# Patient Record
Sex: MALE | Race: WHITE | Employment: PART TIME | URBAN - METROPOLITAN AREA
[De-identification: names, ages, dates, MRNs, and addresses within clinical notes are randomized per-mention and may not be internally consistent; named-entity substitution may affect disease eponyms.]

---

## 2017-04-04 ENCOUNTER — TELEPHONE (OUTPATIENT)
Dept: CARDIOLOGY | Facility: CLINIC | Age: 67
End: 2017-04-04

## 2017-04-04 DIAGNOSIS — N52.9 ED (ERECTILE DYSFUNCTION): ICD-10-CM

## 2017-04-04 RX ORDER — SILDENAFIL 100 MG/1
100 TABLET, FILM COATED ORAL DAILY PRN
Qty: 4 TABLET | Refills: 3 | Status: SHIPPED | OUTPATIENT
Start: 2017-04-04 | End: 2017-04-11

## 2017-04-04 NOTE — TELEPHONE ENCOUNTER
Pt called and asked if prescription could be mailed to him.  Informed him it could. Placed in mail.

## 2017-04-04 NOTE — TELEPHONE ENCOUNTER
Pt called and left a message asking for the print out prescription of Viagra so he can take it to [facility] to fill it since it is cheaper.  He stated it is ok to leave a VM on his phone.  Called patient back and left a message that writer will print out the Viagra prescription and have it available for him to  in the [place] Office. Team 4 number left for questions or concerns.

## 2017-04-11 RX ORDER — SILDENAFIL 100 MG/1
100 TABLET, FILM COATED ORAL DAILY PRN
Qty: 50 TABLET | Refills: 0 | Status: SHIPPED | OUTPATIENT
Start: 2017-04-11 | End: 2017-04-25

## 2017-04-11 NOTE — TELEPHONE ENCOUNTER
Patient called and stated that he received a prescription for Viagra however, he would like to increase the quantity of 50 tablets. Spoke with Dr. Little and he stated that would be fine. Prescription printed and mailed to patients home.

## 2017-04-25 ENCOUNTER — OFFICE VISIT (OUTPATIENT)
Dept: FAMILY MEDICINE | Facility: CLINIC | Age: 67
End: 2017-04-25
Payer: COMMERCIAL

## 2017-04-25 VITALS
HEART RATE: 67 BPM | SYSTOLIC BLOOD PRESSURE: 120 MMHG | TEMPERATURE: 98.5 F | BODY MASS INDEX: 28.2 KG/M2 | DIASTOLIC BLOOD PRESSURE: 70 MMHG | WEIGHT: 179.7 LBS | OXYGEN SATURATION: 96 % | HEIGHT: 67 IN

## 2017-04-25 DIAGNOSIS — E78.5 HYPERLIPIDEMIA LDL GOAL <70: ICD-10-CM

## 2017-04-25 DIAGNOSIS — N52.9 ERECTILE DYSFUNCTION, UNSPECIFIED ERECTILE DYSFUNCTION TYPE: ICD-10-CM

## 2017-04-25 DIAGNOSIS — Z00.00 ROUTINE GENERAL MEDICAL EXAMINATION AT A HEALTH CARE FACILITY: Primary | ICD-10-CM

## 2017-04-25 DIAGNOSIS — I25.10 CORONARY ARTERY DISEASE INVOLVING NATIVE CORONARY ARTERY OF NATIVE HEART WITHOUT ANGINA PECTORIS: ICD-10-CM

## 2017-04-25 DIAGNOSIS — E78.5 HYPERLIPIDEMIA LDL GOAL <100: ICD-10-CM

## 2017-04-25 LAB — HGB BLD-MCNC: 13.9 G/DL (ref 13.3–17.7)

## 2017-04-25 PROCEDURE — 85018 HEMOGLOBIN: CPT | Performed by: FAMILY MEDICINE

## 2017-04-25 PROCEDURE — G0438 PPPS, INITIAL VISIT: HCPCS | Performed by: FAMILY MEDICINE

## 2017-04-25 PROCEDURE — 36415 COLL VENOUS BLD VENIPUNCTURE: CPT | Performed by: FAMILY MEDICINE

## 2017-04-25 PROCEDURE — 80053 COMPREHEN METABOLIC PANEL: CPT | Performed by: FAMILY MEDICINE

## 2017-04-25 PROCEDURE — 80061 LIPID PANEL: CPT | Performed by: FAMILY MEDICINE

## 2017-04-25 RX ORDER — MULTIVIT WITH MINERALS/LUTEIN
1 TABLET ORAL DAILY
COMMUNITY

## 2017-04-25 RX ORDER — SILDENAFIL CITRATE 20 MG/1
TABLET ORAL
Qty: 30 TABLET | Refills: 5 | Status: SHIPPED | OUTPATIENT
Start: 2017-04-25 | End: 2018-10-12

## 2017-04-25 RX ORDER — SIMVASTATIN 20 MG
20 TABLET ORAL AT BEDTIME
Qty: 90 TABLET | Refills: 4 | Status: SHIPPED | OUTPATIENT
Start: 2017-04-25 | End: 2017-09-21

## 2017-04-25 NOTE — NURSING NOTE
"Chief Complaint   Patient presents with     Physical       Initial /70 (BP Location: Right arm, Patient Position: Chair, Cuff Size: Adult Large)  Pulse 67  Temp 98.5  F (36.9  C) (Oral)  Ht 5' 7\" (1.702 m)  Wt 179 lb 11.2 oz (81.5 kg)  SpO2 96%  BMI 28.14 kg/m2 Estimated body mass index is 28.14 kg/(m^2) as calculated from the following:    Height as of this encounter: 5' 7\" (1.702 m).    Weight as of this encounter: 179 lb 11.2 oz (81.5 kg).  Medication Reconciliation: complete   MITA Cabrera      "

## 2017-04-25 NOTE — MR AVS SNAPSHOT
After Visit Summary   4/25/2017    Mj Meeks    MRN: [id number]           Patient Information     Date Of Birth          1950        Visit Information        Provider Department      4/25/2017 10:30 AM Reinier Santiago MD [facility]        Today's Diagnoses     Routine general medical examination at a health care facility    -  1    Hyperlipidemia LDL goal <70        Coronary artery disease involving native coronary artery of native heart without angina pectoris        Hyperlipidemia LDL goal <100        Erectile dysfunction, unspecified erectile dysfunction type          Care Instructions      Preventive Health Recommendations:   Male Ages 65 and over    Yearly exam:             See your health care provider every year in order to  o   Review health changes.   o   Discuss preventive care.    o   Review your medicines if your doctor has prescribed any.    Talk with your health care provider about whether you should have a test to screen for prostate cancer (PSA).    Every 3 years, have a diabetes test (fasting glucose). If you are at risk for diabetes, you should have this test more often.    Every 5 years, have a cholesterol test. Have this test more often if you are at risk for high cholesterol or heart disease.     Every 10 years, have a colonoscopy. Or, have a yearly FIT test (stool test). These exams will check for colon cancer.    Talk to with your health care provider about screening for Abdominal Aortic Aneurysm if you have a family history of AAA or have a history of smoking.    Shots:     Get a flu shot each year.     Get a tetanus shot every 10 years.     Talk to your doctor about your pneumonia vaccines. There are now two you should receive - Pneumovax (PPSV 23) and Prevnar (PCV 13).     Talk to your doctor about a shingles vaccine.     Talk to your doctor about the hepatitis B vaccine.  Nutrition:     Eat at least 5 servings of fruits and vegetables each day.  "    Eat whole-grain bread, whole-wheat pasta and brown rice instead of white grains and rice.     Talk to your provider about Calcium and Vitamin D.   Lifestyle    Exercise for at least 150 minutes a week (30 minutes a day, 5 days a week). This will help you control your weight and prevent disease.     Limit alcohol to one drink per day.     No smoking.     Wear sunscreen to prevent skin cancer.     See your dentist every six months for an exam and cleaning.     See your eye doctor every 1 to 2 years to screen for conditions such as glaucoma, macular degeneration, cataracts, etc         Follow-ups after your visit        Who to contact     If you have questions or need follow up information about today's clinic visit or your schedule please contact [facility] directly at [phone].  Normal or non-critical lab and imaging results will be communicated to you by MyChart, letter or phone within 4 business days after the clinic has received the results. If you do not hear from us within 7 days, please contact the clinic through MyChart or phone. If you have a critical or abnormal lab result, we will notify you by phone as soon as possible.  Submit refill requests through [facility] or call your pharmacy and they will forward the refill request to us. Please allow 3 business days for your refill to be completed.          Additional Information About Your Visit        Shahiya Information     [facility]ya lets you send messages to your doctor, view your test results, renew your prescriptions, schedule appointments and more. To sign up, go to [email or web address] . Click on \"Log in\" on the left side of the screen, which will take you to the Welcome page. Then click on \"Sign up Now\" on the right side of the page.     You will be asked to enter the access code listed below, as well as some personal information. Please follow the directions to create your username and password.     Your access code is: " [email or web address]  Expires: 2017 11:12 AM     Your access code will  in 90 days. If you need help or a new code, please call your [place] clinic or [phone].        Care EveryWhere ID     This is your Care EveryWhere ID. This could be used by other organizations to access your [place] medical records  [id number]        Your Vitals Were     Pulse Temperature Height Pulse Oximetry BMI (Body Mass Index)       67 98.5  F (36.9  C) (Oral) 5' 7\" (1.702 m) 96% 28.14 kg/m2        Blood Pressure from Last 3 Encounters:   17 120/70   10/26/16 136/74   16 132/74    Weight from Last 3 Encounters:   17 179 lb 11.2 oz (81.5 kg)   16 181 lb 1.6 oz (82.1 kg)   16 186 lb 6.4 oz (84.6 kg)              We Performed the Following     Comprehensive metabolic panel     Hemoglobin     Lipid panel reflex to direct LDL          Today's Medication Changes          These changes are accurate as of: 17 11:12 AM.  If you have any questions, ask your nurse or doctor.               Start taking these medicines.        Dose/Directions    sildenafil 20 MG tablet   Commonly known as:  REVATIO/VIAGRA   Used for:  Erectile dysfunction, unspecified erectile dysfunction type   Started by:  Reinier Santiago MD        Take 2-3 tablet (40-60 mg) by mouth daily as needed.  Never use with nitroglycerin, terazosin or doxazosin.   Quantity:  30 tablet   Refills:  5         Stop taking these medicines if you haven't already. Please contact your care team if you have questions.     sildenafil 100 MG cap/tab   Commonly known as:  VIAGRA   Stopped by:  Reinier Santiago MD                Where to get your medicines      These medications were sent to [facility] [address] [facility] [address] [facility] [address] [address] [address] [facility], [facility] [address]     Phone:  [phone]     simvastatin 20 MG tablet         Call your pharmacy to confirm that your medication is ready for pickup. It " may take up to 24 hours for them to receive the prescription. If the prescription is not ready within 3 business days, please contact your clinic or your provider.     We will let you know when these medications are ready. If you don't hear back within 3 business days, please contact us.     sildenafil 20 MG tablet                Primary Care Provider Office Phone # Fax #    Reinier Santiago [phone]       [facility] [address]  [facility] [address]        Thank you!     Thank you for choosing [facility]  for your care. Our goal is always to provide you with excellent care. Hearing back from our patients is one way we can continue to improve our services. Please take a few minutes to complete the written survey that you may receive in the mail after your visit with us. Thank you!             Your Updated Medication List - Protect others around you: Learn how to safely use, store and throw away your medicines at www.disposemymeds.org.          This list is accurate as of: 4/25/17 11:12 AM.  Always use your most recent med list.                   Brand Name Dispense Instructions for use    aspirin 81 MG tablet      Take 81 mg by mouth daily       CENTRUM SILVER per tablet      Take 1 tablet by mouth daily       sildenafil 20 MG tablet    REVATIO/VIAGRA    30 tablet    Take 2-3 tablet (40-60 mg) by mouth daily as needed.  Never use with nitroglycerin, terazosin or doxazosin.       simvastatin 20 MG tablet    ZOCOR    90 tablet    Take 1 tablet (20 mg) by mouth At Bedtime       VITAMIN C PO      Take 1,000 mg by mouth       VITAMIN D3 PO      Take by mouth daily

## 2017-04-25 NOTE — PROGRESS NOTES
SUBJECTIVE:                                                            Mj Meeks is a 67 year old male who presents for Preventive Visit.    Are you in the first 12 months of your Medicare Part B coverage?  No    Healthy Habits:    Do you get at least three servings of calcium containing foods daily (dairy, green leafy vegetables, etc.)? no    Amount of exercise or daily activities, outside of work: 5-7 day(s) per week    Problems taking medications regularly No    Medication side effects: No    Have you had an eye exam in the past two years? yes    Do you see a dentist twice per year? yes    Do you have sleep apnea, excessive snoring or daytime drowsiness?no    COGNITIVE SCREEN  1) Repeat 3 items (Banana, Sunrise, Chair)    2) Clock draw: NORMAL  3) 3 item recall: Recalls 2 objects   Results: NORMAL clock, 1-2 items recalled: COGNITIVE IMPAIRMENT LESS LIKELY    Mini-CogTM Copyright S Henrique. Licensed by the author for use in [facility]; reprinted with permission (elisabeth@[employer]). All rights reserved.            No concerns     Reviewed and updated as needed this visit by clinical staff  Tobacco  Allergies  Med Hx  Surg Hx  Fam Hx  Soc Hx        Reviewed and updated as needed this visit by Provider        Social History   Substance Use Topics     Smoking status: Former Smoker     Packs/day: 2.00     Years: 20.00     Smokeless tobacco: Former User     Quit date: 9/23/2012     Alcohol use 0.0 oz/week     0 Standard drinks or equivalent per week      Comment: 2+ drinks daily     Today's PHQ-2 Score:   PHQ-2 ( 1999 Pfizer) 7/19/2016 7/11/2016   Q1: Little interest or pleasure in doing things 0 0   Q2: Feeling down, depressed or hopeless 0 0   PHQ-2 Score 0 0       Do you feel safe in your environment - Yes    Do you have a Health Care Directive?: No: Advance care planning was reviewed with patient; patient declined at this time.    Current providers sharing in care for this patient include:  "  Patient Care Team:  Reinier Santiago MD as PCP - General (Family Practice)      Hearing impairment: No    Ability to successfully perform activities of daily living: Yes, no assistance needed     Fall risk:  Fallen 2 or more times in the past year?: No  Any fall with injury in the past year?: No    Home safety:  none identified      The following health maintenance items are reviewed in Epic and correct as of today:  Health Maintenance   Topic Date Due     LUNG CANCER SCREENING ANNUAL  05/13/2016     INFLUENZA VACCINE (SYSTEM ASSIGNED)  09/01/2017     FALL RISK ASSESSMENT  04/25/2018     COLONOSCOPY Q5 YR INBASKET MESSAGE  06/22/2020     ADVANCE DIRECTIVE PLANNING Q5 YRS (NO INBASKET)  06/28/2021     LIPID SCREEN Q5 YR MALE (SYSTEM ASSIGNED)  04/25/2022     TETANUS IMMUNIZATION (SYSTEM ASSIGNED)  10/10/2023     PNEUMOCOCCAL  Completed     AORTIC ANEURYSM SCREENING (SYSTEM ASSIGNED)  Completed     HEPATITIS C SCREENING  Completed       ROS:  Constitutional, HEENT, cardiovascular, pulmonary, gi and gu systems are negative, except as otherwise noted.    Problem list, Medication list, Allergies, and Medical/Social/Surgical histories reviewed in [facility] and updated as appropriate.  OBJECTIVE:                                                            /70 (BP Location: Right arm, Patient Position: Chair, Cuff Size: Adult Large)  Pulse 67  Temp 98.5  F (36.9  C) (Oral)  Ht 5' 7\" (1.702 m)  Wt 179 lb 11.2 oz (81.5 kg)  SpO2 96%  BMI 28.14 kg/m2 Estimated body mass index is 28.14 kg/(m^2) as calculated from the following:    Height as of this encounter: 5' 7\" (1.702 m).    Weight as of this encounter: 179 lb 11.2 oz (81.5 kg).  EXAM:   GENERAL: healthy, alert and no distress  EYES: Eyes grossly normal to inspection, PERRL and conjunctivae and sclerae normal  HENT: ear canals and TM's normal, nose and mouth without ulcers or lesions  NECK: no adenopathy, no asymmetry, masses, or scars and thyroid normal to " palpation  RESP: lungs clear to auscultation - no rales, rhonchi or wheezes  CV: regular rate and rhythm, normal S1 S2, no S3 or S4, no murmur, click or rub, no peripheral edema and peripheral pulses strong  ABDOMEN: soft, nontender, no hepatosplenomegaly, no masses and bowel sounds normal   (male): normal male genitalia without lesions or urethral discharge, no hernia  MS: no gross musculoskeletal defects noted, no edema  SKIN: no suspicious lesions or rashes  NEURO: Normal strength and tone, mentation intact and speech normal  PSYCH: mentation appears normal, affect normal/bright    ASSESSMENT / PLAN:                                                            1. Routine general medical examination at a health care facility    2. Hyperlipidemia LDL goal <70  - simvastatin (ZOCOR) 20 MG tablet; Take 1 tablet (20 mg) by mouth At Bedtime  Dispense: 90 tablet; Refill: 4  - Comprehensive metabolic panel  - Lipid panel reflex to direct LDL    3. Coronary artery disease involving native coronary artery of native heart without angina pectoris  - Hemoglobin    4. Hyperlipidemia LDL goal <100    5. Erectile dysfunction, unspecified erectile dysfunction type  - sildenafil (REVATIO/VIAGRA) 20 MG tablet; Take 2-3 tablet (40-60 mg) by mouth daily as needed.  Never use with nitroglycerin, terazosin or doxazosin.  Dispense: 30 tablet; Refill: 5    End of Life Planning:  Patient currently has an advanced directive: No.  I have verified the patient's ablity to prepare an advanced directive/make health care decisions.  Literature was provided to assist patient in preparing an advanced directive.    COUNSELING:  Reviewed preventive health counseling, as reflected in patient instructions    BP Screening:   Last 3 BP Readings:    BP Readings from Last 3 Encounters:   04/25/17 120/70   10/26/16 136/74   09/13/16 132/74       The following was recommended to the patient:  Re-screen BP within a year and recommended lifestyle  "modifications    Estimated body mass index is 28.14 kg/(m^2) as calculated from the following:    Height as of this encounter: 5' 7\" (1.702 m).    Weight as of this encounter: 179 lb 11.2 oz (81.5 kg).  Weight management plan: Discussed healthy diet and exercise guidelines and patient will follow up in 12 months in clinic to re-evaluate.   reports that he has quit smoking. He has a 40.00 pack-year smoking history. He quit smokeless tobacco use about 4 years ago.    Appropriate preventive services were discussed with this patient, including applicable screening as appropriate for cardiovascular disease, diabetes, osteopenia/osteoporosis, and glaucoma.  As appropriate for age/gender, discussed screening for colorectal cancer, prostate cancer, breast cancer, and cervical cancer. Checklist reviewing preventive services available has been given to the patient.    Reviewed patients plan of care and provided an AVS. The Basic Care Plan (routine screening as documented in Health Maintenance) for Mj meets the Care Plan requirement. This Care Plan has been established and reviewed with the Patient.    Counseling Resources:  ATP IV Guidelines  Pooled Cohorts Equation Calculator  Breast Cancer Risk Calculator  FRAX Risk Assessment  ICSI Preventive Guidelines  Dietary Guidelines for Americans, 2010  USDA's MyPlate  ASA Prophylaxis  Lung CA Screening    Reinier Santiago MD  [facility]  "

## 2017-04-25 NOTE — LETTER
[facility]          [address] [place] [address]                                                                                                       [phone]      Mj Meeks  [address]  [facility] [address]      Dear Mj,    Your complete metabolic panel indicates normal kidney function, normal electrolyte levels (sodium, potassium, and calcium were normal), normal blood sugar level (glucose), and normal liver function (ALT, AST, bilirubin).    Your cholesterol results were all normal.  I would recommend recheck fasting lipid panel in 2-3 years as this looks very good.  Continue your simvastatin.    Your hemoglobin (red blood cell count) was normal.    Enclosed is a copy of the results.      Thank you for choosing [facility]. We appreciate the opportunity to serve you and look forward to supporting your healthcare needs in the future.    If you have any questions or concerns, please call me or my nurse at [phone].        Sincerely,    Reinier Santiago MD          Results for orders placed or performed in visit on 04/25/17   Comprehensive metabolic panel   Result Value Ref Range    Sodium 140 133 - 144 mmol/L    Potassium 4.4 3.4 - 5.3 mmol/L    Chloride 106 94 - 109 mmol/L    Carbon Dioxide 24 20 - 32 mmol/L    Anion Gap 10 3 - 14 mmol/L    Glucose 99 70 - 99 mg/dL    Urea Nitrogen 10 7 - 30 mg/dL    Creatinine 0.81 0.66 - 1.25 mg/dL    GFR Estimate >90  Non  GFR Calc   >60 mL/min/1.7m2    GFR Estimate If Black >90   GFR Calc   >60 mL/min/1.7m2    Calcium 8.6 8.5 - 10.1 mg/dL    Bilirubin Total 1.1 0.2 - 1.3 mg/dL    Albumin 4.1 3.4 - 5.0 g/dL    Protein Total 7.1 6.8 - 8.8 g/dL    Alkaline Phosphatase 107 40 - 150 U/L    ALT 23 0 - 70 U/L    AST 22 0 - 45 U/L   Lipid panel reflex to direct LDL   Result Value Ref Range    Cholesterol 111 <200 mg/dL    Triglycerides 59 <150 mg/dL    HDL  Cholesterol 68 >39 mg/dL    LDL Cholesterol Calculated 31 <100 mg/dL    Non HDL Cholesterol 43 <130 mg/dL   Hemoglobin   Result Value Ref Range    Hemoglobin 13.9 13.3 - 17.7 g/dL

## 2017-04-25 NOTE — TELEPHONE ENCOUNTER
Pending Prescriptions:                       Disp   Refills    sildenafil (REVATIO/VIAGRA) 20 MG tablet *90 tab*5            Sig: TAKE 2 TO 3 TABLETS BY MOUTH DAILY AS NEEDED.           NEVER USE WITH NITROGLYCERIN, TERAZOSIN OR           DOXAZOSIN        RX WAS PRESCRIBED TODAY 4/25/2017-AWAITING PAYER RESPONSE  Last Written Prescription Date: 4/25/2017  Last Fill Quantity: 30,  # refills: 5   Last Office Visit with [facility], P or [facility] prescribing provider: 4/25/2017Pamela

## 2017-04-26 LAB
ALBUMIN SERPL-MCNC: 4.1 G/DL (ref 3.4–5)
ALP SERPL-CCNC: 107 U/L (ref 40–150)
ALT SERPL W P-5'-P-CCNC: 23 U/L (ref 0–70)
ANION GAP SERPL CALCULATED.3IONS-SCNC: 10 MMOL/L (ref 3–14)
AST SERPL W P-5'-P-CCNC: 22 U/L (ref 0–45)
BILIRUB SERPL-MCNC: 1.1 MG/DL (ref 0.2–1.3)
BUN SERPL-MCNC: 10 MG/DL (ref 7–30)
CALCIUM SERPL-MCNC: 8.6 MG/DL (ref 8.5–10.1)
CHLORIDE SERPL-SCNC: 106 MMOL/L (ref 94–109)
CHOLEST SERPL-MCNC: 111 MG/DL
CO2 SERPL-SCNC: 24 MMOL/L (ref 20–32)
CREAT SERPL-MCNC: 0.81 MG/DL (ref 0.66–1.25)
GFR SERPL CREATININE-BSD FRML MDRD: NORMAL ML/MIN/1.7M2
GLUCOSE SERPL-MCNC: 99 MG/DL (ref 70–99)
HDLC SERPL-MCNC: 68 MG/DL
LDLC SERPL CALC-MCNC: 31 MG/DL
NONHDLC SERPL-MCNC: 43 MG/DL
POTASSIUM SERPL-SCNC: 4.4 MMOL/L (ref 3.4–5.3)
PROT SERPL-MCNC: 7.1 G/DL (ref 6.8–8.8)
SODIUM SERPL-SCNC: 140 MMOL/L (ref 133–144)
TRIGL SERPL-MCNC: 59 MG/DL

## 2017-04-27 ENCOUNTER — TELEPHONE (OUTPATIENT)
Dept: FAMILY MEDICINE | Facility: CLINIC | Age: 67
End: 2017-04-27

## 2017-04-27 NOTE — TELEPHONE ENCOUNTER
PA was submitted previously and was denied. Patient will have to pay out of pocket for this medication.    Riccardo JOHNST

## 2017-04-28 RX ORDER — SILDENAFIL CITRATE 20 MG/1
TABLET ORAL
Qty: 90 TABLET | Refills: 5 | OUTPATIENT
Start: 2017-04-28

## 2017-05-01 NOTE — TELEPHONE ENCOUNTER
Patient will get a letter from his insurance about the denial and pharmacy will have access to this information. Barbara Ga

## 2017-05-01 NOTE — TELEPHONE ENCOUNTER
Patient stopped a  [facility] and asked to send denial for PA to the pharmacy.     Kasie Marcus Pat. Rep

## 2017-05-02 ENCOUNTER — TRANSFERRED RECORDS (OUTPATIENT)
Dept: HEALTH INFORMATION MANAGEMENT | Facility: CLINIC | Age: 67
End: 2017-05-02

## 2017-09-20 ENCOUNTER — PRE VISIT (OUTPATIENT)
Dept: CARDIOLOGY | Facility: CLINIC | Age: 67
End: 2017-09-20

## 2017-09-21 ENCOUNTER — OFFICE VISIT (OUTPATIENT)
Dept: CARDIOLOGY | Facility: CLINIC | Age: 67
End: 2017-09-21
Attending: INTERNAL MEDICINE
Payer: COMMERCIAL

## 2017-09-21 VITALS
BODY MASS INDEX: 29.03 KG/M2 | HEIGHT: 67 IN | OXYGEN SATURATION: 98 % | HEART RATE: 62 BPM | SYSTOLIC BLOOD PRESSURE: 120 MMHG | WEIGHT: 185 LBS | DIASTOLIC BLOOD PRESSURE: 74 MMHG

## 2017-09-21 DIAGNOSIS — E78.5 HYPERLIPIDEMIA LDL GOAL <70: ICD-10-CM

## 2017-09-21 DIAGNOSIS — I99.8 VASCULAR CALCIFICATION: ICD-10-CM

## 2017-09-21 PROCEDURE — 99213 OFFICE O/P EST LOW 20 MIN: CPT | Performed by: INTERNAL MEDICINE

## 2017-09-21 RX ORDER — SIMVASTATIN 20 MG
20 TABLET ORAL AT BEDTIME
Qty: 90 TABLET | Refills: 3 | Status: SHIPPED | OUTPATIENT
Start: 2017-09-21 | End: 2018-09-14

## 2017-09-21 NOTE — MR AVS SNAPSHOT
"              After Visit Summary   9/21/2017    Mj Meeks    MRN: [id number]           Patient Information     Date Of Birth          1950        Visit Information        Provider Department      9/21/2017 1:45 PM Ashok Little MD [facility] HEART [facility]        Today's Diagnoses     Vascular calcification        Hyperlipidemia LDL goal <70           Follow-ups after your visit        Additional Services     Follow-Up with Cardiologist                 Future tests that were ordered for you today     Open Future Orders        Priority Expected Expires Ordered    Follow-Up with Cardiologist Routine 9/21/2018 9/22/2018 9/21/2017            Who to contact     If you have questions or need follow up information about today's clinic visit or your schedule please contact [facility] directly at [phone].  Normal or non-critical lab and imaging results will be communicated to you by MyChart, letter or phone within 4 business days after the clinic has received the results. If you do not hear from us within 7 days, please contact the clinic through MyChart or phone. If you have a critical or abnormal lab result, we will notify you by phone as soon as possible.  Submit refill requests through [employer] or call your pharmacy and they will forward the refill request to us. Please allow 3 business days for your refill to be completed.          Additional Information About Your Visit        MyChart Information     [employer] lets you send messages to your doctor, view your test results, renew your prescriptions, schedule appointments and more. To sign up, go to [email or web address] . Click on \"Log in\" on the left side of the screen, which will take you to the Welcome page. Then click on \"Sign up Now\" on the right side of the page.     You will be asked to enter the access code listed below, as well as some personal information. Please follow " "the directions to create your username and password.     Your access code is: [email or web address]  Expires: 2017  1:53 PM     Your access code will  in 90 days. If you need help or a new code, please call your [place] clinic or [phone].        Care EveryWhere ID     This is your Care EveryWhere ID. This could be used by other organizations to access your [place] medical records  [id number]        Your Vitals Were     Pulse Height Pulse Oximetry BMI (Body Mass Index)          62 1.702 m (5' 7\") 98% 28.98 kg/m2         Blood Pressure from Last 3 Encounters:   17 120/74   17 120/70   10/26/16 136/74    Weight from Last 3 Encounters:   17 83.9 kg (185 lb)   17 81.5 kg (179 lb 11.2 oz)   16 82.1 kg (181 lb 1.6 oz)              We Performed the Following     Follow-Up with Cardiologist          Where to get your medicines      These medications were sent to [facility] [address] [place] [address] [facility] [address]  [address] [facility] [address]     Phone:  [phone]     simvastatin 20 MG tablet          Primary Care Provider Office Phone # Fax #    Reinier Santiago [phone] [address]  [facility] [address]        Equal Access to Services     MAGUE MANN AH: Hadii aad ku hadasho Soomaali, waaxda luqadaha, qaybta kaalmada adeegyada, mary raza. So [facility] [phone].    ATENCIÓN: Si habla español, tiene a lamb disposición servicios gratuitos de asistencia lingüística. Llame al [phone].    We comply with applicable federal civil rights laws and [place] laws. We do not discriminate on the basis of race, color, national origin, age, disability sex, sexual orientation or gender identity.            Thank you!     Thank you for choosing [facility]ART AT [place]  for your care. Our goal is always to provide you with excellent care. Hearing back from " our patients is one way we can continue to improve our services. Please take a few minutes to complete the written survey that you may receive in the mail after your visit with us. Thank you!             Your Updated Medication List - Protect others around you: Learn how to safely use, store and throw away your medicines at www.disposemymeds.org.          This list is accurate as of: 9/21/17  1:53 PM.  Always use your most recent med list.                   Brand Name Dispense Instructions for use Diagnosis    aspirin 81 MG tablet      Take 81 mg by mouth daily        CENTRUM SILVER per tablet      Take 1 tablet by mouth daily        sildenafil 20 MG tablet    REVATIO    30 tablet    Take 2-3 tablet (40-60 mg) by mouth daily as needed.  Never use with nitroglycerin, terazosin or doxazosin.    Erectile dysfunction, unspecified erectile dysfunction type       simvastatin 20 MG tablet    ZOCOR    90 tablet    Take 1 tablet (20 mg) by mouth At Bedtime    Hyperlipidemia LDL goal <70       VITAMIN C PO      Take 1,000 mg by mouth        VITAMIN D3 PO      Take by mouth daily

## 2017-09-21 NOTE — LETTER
9/21/2017    Reinier Santiago MD  [address]  [facility] [address]    RE: Mj Meeks       Dear Colleague,    I had the pleasure of seeing Mj Meeks in the [facility].    Cardiology Progress Note          Assessment and Plan:     1. Asymptomatic coronary artery disease, medically managed    Reviewed warning signs and symptoms of coronary artery disease, currently not experiencing any.    Continue current medical therapy.  Good LDL control on a small dose of simvastatin.  Not needing any antihypertensive therapy.    Routine follow-up in one year      This note was transcribed using electronic voice recognition software and there may be typographical errors present.                Interval History:   The patient is a very pleasant 67 year old whom I have been following for coronary artery disease seen incidentally on CT.  He denies any exertional chest discomfort or dyspnea on exertion.  He has had negative stress tests in 2012 and 2014.  He has had a difficult year emotionally.  He lost his sister to cancer and his wife  him.  He bought out her equity in the house and is taking care of his [place] outdoor cat.  The house is large for him and takes a lot of effort.  He does not feel he can move because the cat is an outdoor cat.  He has not been playing golf.  He is trying to get back into exercising and running.                       Review of Systems:   Review of Systems:  Skin:  Positive for bruising   Eyes:  Positive for glasses  ENT:  Negative    Respiratory:  Negative    Cardiovascular:  Negative    Gastroenterology: Negative    Genitourinary:  Positive for decreased urinary stream  Musculoskeletal:  Negative    Neurologic:  Negative    Psychiatric:  Positive for excessive stress;anxiety;depression  Heme/Lymph/Imm:  Positive for easy bruising  Endocrine:  Negative                Physical Exam:     Vitals: /74 (BP Location: Right arm, Patient  "Position: Sitting, Cuff Size: Adult Regular)  Pulse 62  Ht 1.702 m (5' 7\")  Wt 83.9 kg (185 lb)  SpO2 98%  BMI 28.98 kg/m2  Constitutional:  cooperative, alert and oriented, well developed, well nourished, in no acute distress        Skin:  warm and dry to the touch, no apparent skin lesions or masses noted        Head:  normocephalic, no masses or lesions        Eyes:  pupils equal and round, conjunctivae and lids unremarkable, sclera white, no xanthalasma, EOMS intact, no nystagmus        ENT:  no pallor or cyanosis, dentition good        Neck:  JVP normal        Chest:  normal breath sounds, clear to auscultation, normal A-P diameter, normal symmetry, normal respiratory excursion, no use of accessory muscles        Cardiac: regular rhythm;no murmurs, gallops or rubs detected                  Abdomen:      benign    Vascular:                                        Extremities and Back:  no deformities, clubbing, cyanosis, erythema observed;no edema        Neurological:  affect appropriate, oriented to time, person and place;no gross motor deficits                 Medications:     Current Outpatient Prescriptions   Medication Sig Dispense Refill     Ascorbic Acid (VITAMIN C PO) Take 1,000 mg by mouth       Multiple Vitamins-Minerals (CENTRUM SILVER) per tablet Take 1 tablet by mouth daily       simvastatin (ZOCOR) 20 MG tablet Take 1 tablet (20 mg) by mouth At Bedtime 90 tablet 4     sildenafil (REVATIO/VIAGRA) 20 MG tablet Take 2-3 tablet (40-60 mg) by mouth daily as needed.  Never use with nitroglycerin, terazosin or doxazosin. 30 tablet 5     Cholecalciferol (VITAMIN D3 PO) Take by mouth daily       aspirin 81 MG tablet Take 81 mg by mouth daily                  Data:   All laboratory data reviewed  No results found for this or any previous visit (from the past 24 hour(s)).    All laboratory data reviewed  Lab Results   Component Value Date    CHOL 111 04/25/2017     Lab Results   Component Value Date    " HDL 68 04/25/2017     Lab Results   Component Value Date    LDL 31 04/25/2017     Lab Results   Component Value Date    TRIG 59 04/25/2017     Lab Results   Component Value Date    CHOLHDLRATIO 1.9 04/27/2015     No results found for: TSH  Last Basic Metabolic Panel:  Lab Results   Component Value Date     04/25/2017      Lab Results   Component Value Date    POTASSIUM 4.4 04/25/2017     Lab Results   Component Value Date    CHLORIDE 106 04/25/2017     Lab Results   Component Value Date    CHLOE 8.6 04/25/2017     Lab Results   Component Value Date    CO2 24 04/25/2017     Lab Results   Component Value Date    BUN 10 04/25/2017     Lab Results   Component Value Date    CR 0.81 04/25/2017     Lab Results   Component Value Date    GLC 99 04/25/2017     No results found for: WBC  No results found for: RBC  Lab Results   Component Value Date    HGB 13.9 04/25/2017     No results found for: HCT  No results found for: MCV  No results found for: MCH  No results found for: MCHC  No results found for: RDW  No results found for: PLT    Thank you for allowing me to participate in the care of your patient.    Sincerely,     Ashok Little MD     [facility]

## 2017-09-21 NOTE — PROGRESS NOTES
"Cardiology Progress Note          Assessment and Plan:     1. Asymptomatic coronary artery disease, medically managed    Reviewed warning signs and symptoms of coronary artery disease, currently not experiencing any.    Continue current medical therapy.  Good LDL control on a small dose of simvastatin.  Not needing any antihypertensive therapy.    Routine follow-up in one year      This note was transcribed using electronic voice recognition software and there may be typographical errors present.                Interval History:   The patient is a very pleasant 67 year old whom I have been following for coronary artery disease seen incidentally on CT.  He denies any exertional chest discomfort or dyspnea on exertion.  He has had negative stress tests in 2012 and 2014.  He has had a difficult year emotionally.  He lost his sister to cancer and his wife  him.  He bought out her equity in the house and is taking care of his [place] outdoor cat.  The house is large for him and takes a lot of effort.  He does not feel he can move because the cat is an outdoor cat.  He has not been playing golf.  He is trying to get back into exercising and running.                       Review of Systems:   Review of Systems:  Skin:  Positive for bruising   Eyes:  Positive for glasses  ENT:  Negative    Respiratory:  Negative    Cardiovascular:  Negative    Gastroenterology: Negative    Genitourinary:  Positive for decreased urinary stream  Musculoskeletal:  Negative    Neurologic:  Negative    Psychiatric:  Positive for excessive stress;anxiety;depression  Heme/Lymph/Imm:  Positive for easy bruising  Endocrine:  Negative                Physical Exam:     Vitals: /74 (BP Location: Right arm, Patient Position: Sitting, Cuff Size: Adult Regular)  Pulse 62  Ht 1.702 m (5' 7\")  Wt 83.9 kg (185 lb)  SpO2 98%  BMI 28.98 kg/m2  Constitutional:  cooperative, alert and oriented, well developed, well nourished, in no acute " Pt called about setting up ablation with . The orders are in there. Can you please call him. Thanks......Tasneem     distress        Skin:  warm and dry to the touch, no apparent skin lesions or masses noted        Head:  normocephalic, no masses or lesions        Eyes:  pupils equal and round, conjunctivae and lids unremarkable, sclera white, no xanthalasma, EOMS intact, no nystagmus        ENT:  no pallor or cyanosis, dentition good        Neck:  JVP normal        Chest:  normal breath sounds, clear to auscultation, normal A-P diameter, normal symmetry, normal respiratory excursion, no use of accessory muscles        Cardiac: regular rhythm;no murmurs, gallops or rubs detected                  Abdomen:      benign    Vascular:                                        Extremities and Back:  no deformities, clubbing, cyanosis, erythema observed;no edema        Neurological:  affect appropriate, oriented to time, person and place;no gross motor deficits                 Medications:     Current Outpatient Prescriptions   Medication Sig Dispense Refill     Ascorbic Acid (VITAMIN C PO) Take 1,000 mg by mouth       Multiple Vitamins-Minerals (CENTRUM SILVER) per tablet Take 1 tablet by mouth daily       simvastatin (ZOCOR) 20 MG tablet Take 1 tablet (20 mg) by mouth At Bedtime 90 tablet 4     sildenafil (REVATIO/VIAGRA) 20 MG tablet Take 2-3 tablet (40-60 mg) by mouth daily as needed.  Never use with nitroglycerin, terazosin or doxazosin. 30 tablet 5     Cholecalciferol (VITAMIN D3 PO) Take by mouth daily       aspirin 81 MG tablet Take 81 mg by mouth daily                  Data:   All laboratory data reviewed  No results found for this or any previous visit (from the past 24 hour(s)).    All laboratory data reviewed  Lab Results   Component Value Date    CHOL 111 04/25/2017     Lab Results   Component Value Date    HDL 68 04/25/2017     Lab Results   Component Value Date    LDL 31 04/25/2017     Lab Results   Component Value Date    TRIG 59 04/25/2017     Lab Results   Component Value Date    CHOLHDLRATIO 1.9 04/27/2015     No results  found for: TSH  Last Basic Metabolic Panel:  Lab Results   Component Value Date     04/25/2017      Lab Results   Component Value Date    POTASSIUM 4.4 04/25/2017     Lab Results   Component Value Date    CHLORIDE 106 04/25/2017     Lab Results   Component Value Date    CHLOE 8.6 04/25/2017     Lab Results   Component Value Date    CO2 24 04/25/2017     Lab Results   Component Value Date    BUN 10 04/25/2017     Lab Results   Component Value Date    CR 0.81 04/25/2017     Lab Results   Component Value Date    GLC 99 04/25/2017     No results found for: WBC  No results found for: RBC  Lab Results   Component Value Date    HGB 13.9 04/25/2017     No results found for: HCT  No results found for: MCV  No results found for: MCH  No results found for: MCHC  No results found for: RDW  No results found for: PLT

## 2017-11-10 ENCOUNTER — ALLIED HEALTH/NURSE VISIT (OUTPATIENT)
Dept: NURSING | Facility: CLINIC | Age: 67
End: 2017-11-10
Payer: COMMERCIAL

## 2017-11-10 DIAGNOSIS — Z23 NEED FOR PROPHYLACTIC VACCINATION AND INOCULATION AGAINST INFLUENZA: Primary | ICD-10-CM

## 2017-11-10 PROCEDURE — 90662 IIV NO PRSV INCREASED AG IM: CPT

## 2017-11-10 PROCEDURE — G0008 ADMIN INFLUENZA VIRUS VAC: HCPCS

## 2017-11-10 NOTE — PROGRESS NOTES

## 2017-11-10 NOTE — MR AVS SNAPSHOT
"              After Visit Summary   11/10/2017    Mj Meeks    MRN: [id number]           Patient Information     Date Of Birth          1950        Visit Information        Provider Department      11/10/2017 10:00 AM BOZEAN KNAPP/LPN [facility]        Today's Diagnoses     Need for prophylactic vaccination and inoculation against influenza    -  1       Follow-ups after your visit        Your next 10 appointments already scheduled     Nov 10, 2017 10:00 AM CST   Nurse Only with LV MA/LPN   [facility] ([facility])    [address] [facility] [address] [phone]              Who to contact     If you have questions or need follow up information about today's clinic visit or your schedule please contact [facility] directly at [phone].  Normal or non-critical lab and imaging results will be communicated to you by MyChart, letter or phone within 4 business days after the clinic has received the results. If you do not hear from us within 7 days, please contact the clinic through MyChart or phone. If you have a critical or abnormal lab result, we will notify you by phone as soon as possible.  Submit refill requests through [employer] or call your pharmacy and they will forward the refill request to us. Please allow 3 business days for your refill to be completed.          Additional Information About Your Visit        MyChart Information     [employer] lets you send messages to your doctor, view your test results, renew your prescriptions, schedule appointments and more. To sign up, go to [email or web address] . Click on \"Log in\" on the left side of the screen, which will take you to the Welcome page. Then click on \"Sign up Now\" on the right side of the page.     You will be asked to enter the access code listed below, as well as some personal information. Please follow the directions to create your username and password.     Your access " code is: [email or web address]  Expires: 2017 12:53 PM     Your access code will  in 90 days. If you need help or a new code, please call your [place] clinic or [phone].        Care EveryWhere ID     This is your Care EveryWhere ID. This could be used by other organizations to access your [place] medical records  [id number]         Blood Pressure from Last 3 Encounters:   17 120/74   17 120/70   10/26/16 136/74    Weight from Last 3 Encounters:   17 185 lb (83.9 kg)   17 179 lb 11.2 oz (81.5 kg)   16 181 lb 1.6 oz (82.1 kg)              We Performed the Following     FLU VACCINE, INCREASED ANTIGEN, PRESV FREE, AGE 65+ [44040]     Vaccine Administration, Initial [13400]        Primary Care Provider Office Phone # Fax #    Reinier Santiago [phone] [address] [address] [facility] [address]        Equal Access to Services     [facility]: Hadii aad ku hadasho Soomaali, waaxda luqadaha, qaybta kaalmada adechristine, mary goff . So [facility] [place] [phone].    ATENCIÓN: Si habla español, tiene a lamb disposición servicios gratuitos de asistencia lingüística. Carmename al [phone].    We comply with applicable federal civil rights laws and [place] laws. We do not discriminate on the basis of race, color, national origin, age, disability, sex, sexual orientation, or gender identity.            Thank you!     Thank you for choosing [facility]  for your care. Our goal is always to provide you with excellent care. Hearing back from our patients is one way we can continue to improve our services. Please take a few minutes to complete the written survey that you may receive in the mail after your visit with us. Thank you!             Your Updated Medication List - Protect others around you: Learn how to safely use, store and throw away your medicines at www.disposemymeds.org.          This list is accurate as of: 11/10/17  9:57  AM.  Always use your most recent med list.                   Brand Name Dispense Instructions for use Diagnosis    aspirin 81 MG tablet      Take 81 mg by mouth daily        CENTRUM SILVER per tablet      Take 1 tablet by mouth daily        sildenafil 20 MG tablet    REVATIO    30 tablet    Take 2-3 tablet (40-60 mg) by mouth daily as needed.  Never use with nitroglycerin, terazosin or doxazosin.    Erectile dysfunction, unspecified erectile dysfunction type       simvastatin 20 MG tablet    ZOCOR    90 tablet    Take 1 tablet (20 mg) by mouth At Bedtime    Hyperlipidemia LDL goal <70       VITAMIN C PO      Take 1,000 mg by mouth        VITAMIN D3 PO      Take by mouth daily

## 2018-05-04 ENCOUNTER — TRANSFERRED RECORDS (OUTPATIENT)
Dept: HEALTH INFORMATION MANAGEMENT | Facility: CLINIC | Age: 68
End: 2018-05-04

## 2018-09-14 DIAGNOSIS — E78.5 HYPERLIPIDEMIA LDL GOAL <70: ICD-10-CM

## 2018-09-14 RX ORDER — SIMVASTATIN 20 MG
20 TABLET ORAL AT BEDTIME
Qty: 90 TABLET | Refills: 3 | Status: SHIPPED | OUTPATIENT
Start: 2018-09-14 | End: 2019-08-27

## 2018-09-14 NOTE — TELEPHONE ENCOUNTER
Received refill request for:  Simvastatin  Last OV was: 9/21/2017 with Dr. Little  Labs/EKG: last lipid 4/25/2017  F/U scheduled: 11/21/2018 with Dr. Little  New script sent to: [facility]

## 2018-10-12 DIAGNOSIS — N52.9 ERECTILE DYSFUNCTION, UNSPECIFIED ERECTILE DYSFUNCTION TYPE: ICD-10-CM

## 2018-10-12 RX ORDER — SILDENAFIL CITRATE 20 MG/1
TABLET ORAL
Qty: 30 TABLET | Refills: 1 | Status: SHIPPED | OUTPATIENT
Start: 2018-10-12 | End: 2018-10-18

## 2018-10-18 DIAGNOSIS — N52.9 ERECTILE DYSFUNCTION, UNSPECIFIED ERECTILE DYSFUNCTION TYPE: ICD-10-CM

## 2018-10-18 RX ORDER — SILDENAFIL CITRATE 20 MG/1
TABLET ORAL
Qty: 50 TABLET | Refills: 1 | Status: SHIPPED | OUTPATIENT
Start: 2018-10-18 | End: 2018-10-22 | Stop reason: DRUGHIGH

## 2018-10-18 NOTE — TELEPHONE ENCOUNTER
Pt called stating that he need never got a script for the  Viagra that was requested. Spoke to P[facility] HERNANDES who refilled it and stated she did not realize it was local print and did not put it in the mail. New prescription sent to patient.

## 2018-10-22 RX ORDER — SILDENAFIL 100 MG/1
100 TABLET, FILM COATED ORAL DAILY PRN
Qty: 50 TABLET | Refills: 0 | Status: SHIPPED | OUTPATIENT
Start: 2018-10-22 | End: 2020-02-25

## 2018-10-22 NOTE — TELEPHONE ENCOUNTER
Pt called requesting the 100 mg tablets as that is was he received last year and that way he does not need to take as many tablets. New prescription mailed to patient

## 2018-11-14 ENCOUNTER — ALLIED HEALTH/NURSE VISIT (OUTPATIENT)
Dept: NURSING | Facility: CLINIC | Age: 68
End: 2018-11-14
Payer: COMMERCIAL

## 2018-11-14 DIAGNOSIS — Z23 NEED FOR PROPHYLACTIC VACCINATION AND INOCULATION AGAINST INFLUENZA: Primary | ICD-10-CM

## 2018-11-14 PROCEDURE — G0008 ADMIN INFLUENZA VIRUS VAC: HCPCS

## 2018-11-14 PROCEDURE — 90662 IIV NO PRSV INCREASED AG IM: CPT

## 2018-11-14 NOTE — PROGRESS NOTES

## 2018-11-14 NOTE — MR AVS SNAPSHOT
After Visit Summary   11/14/2018    Mj Meeks    MRN: [id number]           Patient Information     Date Of Birth          1950        Visit Information        Provider Department      11/14/2018 10:00 AM LV MA/LPN [facility]        Today's Diagnoses     Need for prophylactic vaccination and inoculation against influenza    -  1       Follow-ups after your visit        Your next 10 appointments already scheduled     Nov 14, 2018 10:00 AM CST   Nurse Only with LV MA/LPN   [facility]    [address] [address] [phone]            Nov 21, 2018  2:15 PM CST   Return Visit with Ashok Little MD   [facility] ([facility])    [address]  [facility] [address] [phone]              Who to contact     If you have questions or need follow up information about today's clinic visit or your schedule please contact [facility] directly at [phone].  Normal or non-critical lab and imaging results will be communicated to you by MyChart, letter or phone within 4 business days after the clinic has received the results. If you do not hear from us within 7 days, please contact the clinic through MyChart or phone. If you have a critical or abnormal lab result, we will notify you by phone as soon as possible.  Submit refill requests through [employer] or call your pharmacy and they will forward the refill request to us. Please allow 3 business days for your refill to be completed.          Additional Information About Your Visit        Care EveryWhere ID     This is your Care EveryWhere ID. This could be used by other organizations to access your [place] medical records  [id number]         Blood Pressure from Last 3 Encounters:   09/21/17 120/74   04/25/17 120/70   10/26/16 136/74    Weight from Last 3 Encounters:   09/21/17  185 lb (83.9 kg)   04/25/17 179 lb 11.2 oz (81.5 kg)   09/13/16 181 lb 1.6 oz (82.1 kg)              We Performed the Following     FLU VACCINE, INCREASED ANTIGEN, PRESV FREE, AGE 65+ [89328]     Vaccine Administration, Initial [80347]        Primary Care Provider Office Phone # Fax #    Reinier Santiago [phone] [address]  [facility] [address]        Equal Access to Services     GUICHO MANN : Hadii aad ku hadasho Soomaali, waaxda luqadaha, qaybta kaalmada adeegyada, waxay idiin hayaan adeeg khnikkie goff . So [facility] [phone].    ATENCIÓN: Si habla español, tiene a lamb disposición servicios gratuitos de asistencia lingüística. Ll[facility] [phone].    We comply with applicable federal civil rights laws and [place] laws. We do not discriminate on the basis of race, color, national origin, age, disability, sex, sexual orientation, or gender identity.            Thank you!     Thank you for choosing [facility]  for your care. Our goal is always to provide you with excellent care. Hearing back from our patients is one way we can continue to improve our services. Please take a few minutes to complete the written survey that you may receive in the mail after your visit with us. Thank you!             Your Updated Medication List - Protect others around you: Learn how to safely use, store and throw away your medicines at www.disposemymeds.org.          This list is accurate as of 11/14/18  9:43 AM.  Always use your most recent med list.                   Brand Name Dispense Instructions for use Diagnosis    aspirin 81 MG tablet      Take 81 mg by mouth daily        CENTRUM SILVER per tablet      Take 1 tablet by mouth daily        sildenafil 100 MG tablet    VIAGRA    50 tablet    Take 1 tablet (100 mg) by mouth daily as needed (erectile dysfunction) 30 min to 4 hrs before sex. Do not use with nitroglycerin, terazosin or doxazosin.    Erectile dysfunction, unspecified  erectile dysfunction type       simvastatin 20 MG tablet    ZOCOR    90 tablet    Take 1 tablet (20 mg) by mouth At Bedtime    Hyperlipidemia LDL goal <70       VITAMIN C PO      Take 1,000 mg by mouth        VITAMIN D3 PO      Take by mouth daily

## 2018-11-21 ENCOUNTER — OFFICE VISIT (OUTPATIENT)
Dept: CARDIOLOGY | Facility: CLINIC | Age: 68
End: 2018-11-21
Payer: COMMERCIAL

## 2018-11-21 VITALS
HEART RATE: 60 BPM | DIASTOLIC BLOOD PRESSURE: 76 MMHG | BODY MASS INDEX: 27.62 KG/M2 | HEIGHT: 67 IN | WEIGHT: 176 LBS | SYSTOLIC BLOOD PRESSURE: 130 MMHG

## 2018-11-21 DIAGNOSIS — R09.89 BILATERAL CAROTID BRUITS: ICD-10-CM

## 2018-11-21 DIAGNOSIS — I25.83 CORONARY ARTERY DISEASE DUE TO LIPID RICH PLAQUE: Primary | ICD-10-CM

## 2018-11-21 DIAGNOSIS — I25.10 CORONARY ARTERY DISEASE DUE TO LIPID RICH PLAQUE: Primary | ICD-10-CM

## 2018-11-21 PROCEDURE — 99213 OFFICE O/P EST LOW 20 MIN: CPT | Performed by: INTERNAL MEDICINE

## 2018-11-21 NOTE — MR AVS SNAPSHOT
"              After Visit Summary   11/21/2018    Mj Meeks    MRN: [id number]           Patient Information     Date Of Birth          1950        Visit Information        Provider Department      11/21/2018 2:15 PM Ashok Little MD [facility]        Today's Diagnoses     Coronary artery disease due to lipid rich plaque    -  1    Bilateral carotid bruits           Follow-ups after your visit        Additional Services     Follow-Up with Cardiologist                 Future tests that were ordered for you today     Open Future Orders        Priority Expected Expires Ordered    US Carotid Bilateral Routine 3/28/2019 11/21/2019 11/21/2018    Follow-Up with Cardiologist Routine 11/21/2019 11/22/2019 11/21/2018            Who to contact     If you have questions or need follow up information about today's clinic visit or your schedule please contact [facility] directly at [phone].  Normal or non-critical lab and imaging results will be communicated to you by MyChart, letter or phone within 4 business days after the clinic has received the results. If you do not hear from us within 7 days, please contact the clinic through MyChart or phone. If you have a critical or abnormal lab result, we will notify you by phone as soon as possible.  Submit refill requests through [employer] or call your pharmacy and they will forward the refill request to us. Please allow 3 business days for your refill to be completed.          Additional Information About Your Visit        Care EveryWhere ID     This is your Care EveryWhere ID. This could be used by other organizations to access your [place] medical records  [id number]        Your Vitals Were     Pulse Height BMI (Body Mass Index)             60 1.702 m (5' 7\") 27.57 kg/m2          Blood Pressure from Last 3 Encounters:   11/21/18 130/76   09/21/17 120/74   04/25/17 120/70 "    Weight from Last 3 Encounters:   11/21/18 79.8 kg (176 lb)   09/21/17 83.9 kg (185 lb)   04/25/17 81.5 kg (179 lb 11.2 oz)               Primary Care Provider Office Phone # Fax #    Reinier Santiago [phone] [address]  [facility] [address]        Equal Access to Services     [facility] CenterRO[facility] : Hadii aad ku hadasho Soomaali, waaxda luqadaha, qaybta kaalmada adeegyada, waxay idiin hayaan adeeg kharash la'aan ah. So [facility] [phone].    ATENCIÓN: Si habla espfranco, tiene a lamb disposición servicios gratuitos de asistencia lingüística. Llame al [phone].    We comply with applicable federal civil rights laws and [place] laws. We do not discriminate on the basis of race, color, national origin, age, disability, sex, sexual orientation, or gender identity.            Thank you!     Thank you for choosing [facility]  for your care. Our goal is always to provide you with excellent care. Hearing back from our patients is one way we can continue to improve our services. Please take a few minutes to complete the written survey that you may receive in the mail after your visit with us. Thank you!             Your Updated Medication List - Protect others around you: Learn how to safely use, store and throw away your medicines at www.disposemymeds.org.          This list is accurate as of 11/21/18  2:42 PM.  Always use your most recent med list.                   Brand Name Dispense Instructions for use Diagnosis    aspirin 81 MG tablet    ASA     Take 81 mg by mouth daily        CENTRUM SILVER tablet      Take 1 tablet by mouth daily        sildenafil 100 MG tablet    VIAGRA    50 tablet    Take 1 tablet (100 mg) by mouth daily as needed (erectile dysfunction) 30 min to 4 hrs before sex. Do not use with nitroglycerin, terazosin or doxazosin.    Erectile dysfunction, unspecified erectile dysfunction type       simvastatin 20 MG tablet    ZOCOR    90  tablet    Take 1 tablet (20 mg) by mouth At Bedtime    Hyperlipidemia LDL goal <70       VITAMIN C PO      Take 1,000 mg by mouth        VITAMIN D3 PO      Take by mouth daily

## 2018-11-21 NOTE — LETTER
11/21/2018    Reinier Santiago MD  [address]  [facility] [address]    RE: Mj Meeks       Dear Colleague,    I had the pleasure of seeing Mj Meeks in the [facility].    Cardiology Progress Note          Assessment and Plan:     1. Asymptomatic coronary artery disease seen on CT    Excellent LDL control.  Continue medical management.      2. Carotid artery bruit left greater than right    Asymptomatic.  Carotid ultrasound sometime in the next 6 months.    Routine follow-up in 1 year    This note was transcribed using electronic voice recognition software and there may be typographical errors present.                Interval History:   The patient is a very pleasant 68 year old whom I have been following for coronary artery calcification with negative stress testing.  He has had a very terrible few years.  He lost his sister from ovarian cancer in 2016.  His wife  him and left in 2017.  This year, his son was living with him and was describing symptoms of acute coronary syndrome but refused to go to the hospital.  Despite repeated urging to go, the son wanted to stay home and rest.  The symptoms escalated until the point where the son finally wanted to go to the emergency department, but by then it was too late.  He had sudden cardiac death right in front of Mr. Meeks which left him feeling very guilty that he did not do more.  He also felt guilty that he was not able to do CPR as the patient's son was very heavy and face down.  He could not roll him over to do CPR.  The son subsequently was pronounced dead.    He does take some solace in that his son was an organ donor and his organ were used to help 44 people.  He wears green bracelets of organ donation and a pin also in remembrance.    He personally is feeling okay.  He did inherit his son's mendoza retriever and still has his wife's cat.  He has been trying to lose weight and is motivated to keep himself  "healthy.                     Review of Systems:   Review of Systems:  Skin:  Negative for     Eyes:  Positive for    ENT:  Negative for    Respiratory:  Negative    Cardiovascular:  Negative for    Gastroenterology:      Genitourinary:  not assessed    Musculoskeletal:  Negative for    Neurologic:       Psychiatric:  Negative    Heme/Lymph/Imm:  Negative    Endocrine:  Negative                Physical Exam:     Vitals: /76  Pulse 60  Ht 1.702 m (5' 7\")  Wt 79.8 kg (176 lb)  BMI 27.57 kg/m2  Constitutional:  cooperative, alert and oriented, well developed, well nourished, in no acute distress        Skin:  warm and dry to the touch, no apparent skin lesions or masses noted        Head:  normocephalic, no masses or lesions        Eyes:  pupils equal and round, conjunctivae and lids unremarkable, sclera white, no xanthalasma, EOMS intact, no nystagmus        ENT:  no pallor or cyanosis, dentition good        Neck:  JVP normal bilateral carotid bruit L>R    Chest:  normal breath sounds, clear to auscultation, normal A-P diameter, normal symmetry, normal respiratory excursion, no use of accessory muscles        Cardiac: regular rhythm;no murmurs, gallops or rubs detected                  Abdomen:      benign    Extremities and Back:  no deformities, clubbing, cyanosis, erythema observed;no edema        Neurological:  no gross motor deficits;affect appropriate                 Medications:     Current Outpatient Prescriptions   Medication Sig Dispense Refill     Ascorbic Acid (VITAMIN C PO) Take 1,000 mg by mouth       aspirin 81 MG tablet Take 81 mg by mouth daily       Cholecalciferol (VITAMIN D3 PO) Take by mouth daily       Multiple Vitamins-Minerals (CENTRUM SILVER) per tablet Take 1 tablet by mouth daily       sildenafil (VIAGRA) 100 MG tablet Take 1 tablet (100 mg) by mouth daily as needed (erectile dysfunction) 30 min to 4 hrs before sex. Do not use with nitroglycerin, terazosin or doxazosin. 50 tablet 0 "     simvastatin (ZOCOR) 20 MG tablet Take 1 tablet (20 mg) by mouth At Bedtime 90 tablet 3                Data:   All laboratory data reviewed  No results found for this or any previous visit (from the past 24 hour(s)).    All laboratory data reviewed  Lab Results   Component Value Date    CHOL 111 04/25/2017     Lab Results   Component Value Date    HDL 68 04/25/2017     Lab Results   Component Value Date    LDL 31 04/25/2017     Lab Results   Component Value Date    TRIG 59 04/25/2017     Lab Results   Component Value Date    CHOLHDLRATIO 1.9 04/27/2015     No results found for: TSH  Last Basic Metabolic Panel:  Lab Results   Component Value Date     04/25/2017      Lab Results   Component Value Date    POTASSIUM 4.4 04/25/2017     Lab Results   Component Value Date    CHLORIDE 106 04/25/2017     Lab Results   Component Value Date    CHLOE 8.6 04/25/2017     Lab Results   Component Value Date    CO2 24 04/25/2017     Lab Results   Component Value Date    BUN 10 04/25/2017     Lab Results   Component Value Date    CR 0.81 04/25/2017     Lab Results   Component Value Date    GLC 99 04/25/2017     No results found for: WBC  No results found for: RBC  Lab Results   Component Value Date    HGB 13.9 04/25/2017     No results found for: HCT  No results found for: MCV  No results found for: MCH  No results found for: MCHC  No results found for: RDW  No results found for: PLT      Thank you for allowing me to participate in the care of your patient.    Sincerely,     Ashok Little MD     [facility]

## 2018-11-21 NOTE — PROGRESS NOTES
Cardiology Progress Note          Assessment and Plan:     1. Asymptomatic coronary artery disease seen on CT    Excellent LDL control.  Continue medical management.      2. Carotid artery bruit left greater than right    Asymptomatic.  Carotid ultrasound sometime in the next 6 months.    Routine follow-up in 1 year    This note was transcribed using electronic voice recognition software and there may be typographical errors present.                Interval History:   The patient is a very pleasant 68 year old whom I have been following for coronary artery calcification with negative stress testing.  He has had a very terrible few years.  He lost his sister from ovarian cancer in 2016.  His wife  him and left in 2017.  This year, his son was living with him and was describing symptoms of acute coronary syndrome but refused to go to the hospital.  Despite repeated urging to go, the son wanted to stay home and rest.  The symptoms escalated until the point where the son finally wanted to go to the emergency department, but by then it was too late.  He had sudden cardiac death right in front of Mr. Meeks which left him feeling very guilty that he did not do more.  He also felt guilty that he was not able to do CPR as the patient's son was very heavy and face down.  He could not roll him over to do CPR.  The son subsequently was pronounced dead.    He does take some solace in that his son was an organ donor and his organ were used to help 44 people.  He wears green bracelets of organ donation and a pin also in remembrance.    He personally is feeling okay.  He did inherit his son's mendoza retriever and still has his wife's cat.  He has been trying to lose weight and is motivated to keep himself healthy.                     Review of Systems:   Review of Systems:  Skin:  Negative for     Eyes:  Positive for    ENT:  Negative for    Respiratory:  Negative    Cardiovascular:  Negative for    Gastroenterology:     "  Genitourinary:  not assessed    Musculoskeletal:  Negative for    Neurologic:       Psychiatric:  Negative    Heme/Lymph/Imm:  Negative    Endocrine:  Negative                Physical Exam:     Vitals: /76  Pulse 60  Ht 1.702 m (5' 7\")  Wt 79.8 kg (176 lb)  BMI 27.57 kg/m2  Constitutional:  cooperative, alert and oriented, well developed, well nourished, in no acute distress        Skin:  warm and dry to the touch, no apparent skin lesions or masses noted        Head:  normocephalic, no masses or lesions        Eyes:  pupils equal and round, conjunctivae and lids unremarkable, sclera white, no xanthalasma, EOMS intact, no nystagmus        ENT:  no pallor or cyanosis, dentition good        Neck:  JVP normal bilateral carotid bruit L>R    Chest:  normal breath sounds, clear to auscultation, normal A-P diameter, normal symmetry, normal respiratory excursion, no use of accessory muscles        Cardiac: regular rhythm;no murmurs, gallops or rubs detected                  Abdomen:      benign    Extremities and Back:  no deformities, clubbing, cyanosis, erythema observed;no edema        Neurological:  no gross motor deficits;affect appropriate                 Medications:     Current Outpatient Prescriptions   Medication Sig Dispense Refill     Ascorbic Acid (VITAMIN C PO) Take 1,000 mg by mouth       aspirin 81 MG tablet Take 81 mg by mouth daily       Cholecalciferol (VITAMIN D3 PO) Take by mouth daily       Multiple Vitamins-Minerals (CENTRUM SILVER) per tablet Take 1 tablet by mouth daily       sildenafil (VIAGRA) 100 MG tablet Take 1 tablet (100 mg) by mouth daily as needed (erectile dysfunction) 30 min to 4 hrs before sex. Do not use with nitroglycerin, terazosin or doxazosin. 50 tablet 0     simvastatin (ZOCOR) 20 MG tablet Take 1 tablet (20 mg) by mouth At Bedtime 90 tablet 3                Data:   All laboratory data reviewed  No results found for this or any previous visit (from the past 24 " hour(s)).    All laboratory data reviewed  Lab Results   Component Value Date    CHOL 111 04/25/2017     Lab Results   Component Value Date    HDL 68 04/25/2017     Lab Results   Component Value Date    LDL 31 04/25/2017     Lab Results   Component Value Date    TRIG 59 04/25/2017     Lab Results   Component Value Date    CHOLHDLRATIO 1.9 04/27/2015     No results found for: TSH  Last Basic Metabolic Panel:  Lab Results   Component Value Date     04/25/2017      Lab Results   Component Value Date    POTASSIUM 4.4 04/25/2017     Lab Results   Component Value Date    CHLORIDE 106 04/25/2017     Lab Results   Component Value Date    CHLOE 8.6 04/25/2017     Lab Results   Component Value Date    CO2 24 04/25/2017     Lab Results   Component Value Date    BUN 10 04/25/2017     Lab Results   Component Value Date    CR 0.81 04/25/2017     Lab Results   Component Value Date    GLC 99 04/25/2017     No results found for: WBC  No results found for: RBC  Lab Results   Component Value Date    HGB 13.9 04/25/2017     No results found for: HCT  No results found for: MCV  No results found for: MCH  No results found for: MCHC  No results found for: RDW  No results found for: PLT

## 2019-04-02 ENCOUNTER — HOSPITAL ENCOUNTER (OUTPATIENT)
Dept: CARDIOLOGY | Facility: CLINIC | Age: 69
Discharge: HOME | End: 2019-04-02
Attending: INTERNAL MEDICINE | Admitting: INTERNAL MEDICINE
Payer: COMMERCIAL

## 2019-04-02 DIAGNOSIS — I25.83 CORONARY ARTERY DISEASE DUE TO LIPID RICH PLAQUE: ICD-10-CM

## 2019-04-02 DIAGNOSIS — I25.10 CORONARY ARTERY DISEASE DUE TO LIPID RICH PLAQUE: ICD-10-CM

## 2019-04-02 DIAGNOSIS — R09.89 BILATERAL CAROTID BRUITS: ICD-10-CM

## 2019-04-02 PROCEDURE — 93880 EXTRACRANIAL BILAT STUDY: CPT | Mod: 26 | Performed by: INTERNAL MEDICINE

## 2019-04-02 PROCEDURE — 93880 EXTRACRANIAL BILAT STUDY: CPT

## 2019-04-04 ENCOUNTER — TELEPHONE (OUTPATIENT)
Dept: CARDIOLOGY | Facility: CLINIC | Age: 69
End: 2019-04-04

## 2019-04-04 NOTE — TELEPHONE ENCOUNTER
Notes recorded by Ashok Little MD on 4/4/2019 at 11:30 AM CDT  There is some cholesterol narrowing in the left internal carotid artery, but not severe.  We will continue to follow it over time and could consider changing the simvastatin to rosuvastatin 20 mg daily for more potency of keeping the cholesterol down.    Contacted patient to review results and Dr. Little's comments. Patient did not answer. Left message for patient to call back.

## 2019-04-05 ENCOUNTER — TELEPHONE (OUTPATIENT)
Dept: CARDIOLOGY | Facility: CLINIC | Age: 69
End: 2019-04-05

## 2019-04-05 NOTE — TELEPHONE ENCOUNTER
RN called patient and reviewed with him the carotid ultrasound results and Dr. Little's recommendations below. Patient verbalized understanding and had no further questions at this time. Patient aware to f/u with Dr. Little in 6 months for 1 year follow up. Patient will call clinic with any further questions/concerns prior to f/u apt.       There is some cholesterol narrowing in the left internal carotid artery, but not severe.  We will continue to follow it over time and could consider changing the simvastatin to rosuvastatin 20 mg daily for more potency of keeping the cholesterol down

## 2019-07-05 ENCOUNTER — TELEPHONE (OUTPATIENT)
Dept: CARDIOLOGY | Facility: CLINIC | Age: 69
End: 2019-07-05

## 2019-07-05 NOTE — TELEPHONE ENCOUNTER
Received VM from patient stating that he has been having some very brief episodes of lightheadedness when standing and would like a call back to discuss further. Tried to call patient back. No answer. Left VM with team 4 direct number to call back.

## 2019-07-08 NOTE — TELEPHONE ENCOUNTER
AMANDA from patient, requesting a call back to review his symptoms. Spoke with patient. Patient states that he had two episodes of lightheadedness that lasted about 5-6 seconds, happening one hour apart. Patient states that he has not had any more episodes since then. Patient states that he was working outside and cutting the grass when he experienced the episodes. Patient denies any other cardiac symptoms and states that this has only happened once on July 4th. Reviewed with patient to keep an eye on his symptoms and to let us know if he notices these episodes happening more frequently. Patient verbalized understanding and agreed with plan of care.

## 2019-08-27 ENCOUNTER — OFFICE VISIT (OUTPATIENT)
Dept: FAMILY MEDICINE | Facility: CLINIC | Age: 69
End: 2019-08-27
Payer: COMMERCIAL

## 2019-08-27 VITALS
HEART RATE: 75 BPM | SYSTOLIC BLOOD PRESSURE: 126 MMHG | OXYGEN SATURATION: 95 % | RESPIRATION RATE: 16 BRPM | HEIGHT: 68 IN | DIASTOLIC BLOOD PRESSURE: 82 MMHG | BODY MASS INDEX: 28.49 KG/M2 | WEIGHT: 188 LBS | TEMPERATURE: 98.8 F

## 2019-08-27 DIAGNOSIS — E78.5 HYPERLIPIDEMIA LDL GOAL <100: ICD-10-CM

## 2019-08-27 DIAGNOSIS — E78.5 HYPERLIPIDEMIA LDL GOAL <70: ICD-10-CM

## 2019-08-27 DIAGNOSIS — Z00.00 ENCOUNTER FOR MEDICARE ANNUAL WELLNESS EXAM: Primary | ICD-10-CM

## 2019-08-27 DIAGNOSIS — F43.21 ADJUSTMENT DISORDER WITH DEPRESSED MOOD: ICD-10-CM

## 2019-08-27 DIAGNOSIS — F10.20 UNCOMPLICATED ALCOHOL DEPENDENCE (H): ICD-10-CM

## 2019-08-27 DIAGNOSIS — I25.10 CORONARY ARTERY DISEASE INVOLVING NATIVE CORONARY ARTERY OF NATIVE HEART WITHOUT ANGINA PECTORIS: ICD-10-CM

## 2019-08-27 LAB
ERYTHROCYTE [DISTWIDTH] IN BLOOD BY AUTOMATED COUNT: 12.8 % (ref 10–15)
HCT VFR BLD AUTO: 40.6 % (ref 40–53)
HGB BLD-MCNC: 14.1 G/DL (ref 13.3–17.7)
MCH RBC QN AUTO: 31.3 PG (ref 26.5–33)
MCHC RBC AUTO-ENTMCNC: 34.7 G/DL (ref 31.5–36.5)
MCV RBC AUTO: 90 FL (ref 78–100)
PLATELET # BLD AUTO: 229 10E9/L (ref 150–450)
RBC # BLD AUTO: 4.5 10E12/L (ref 4.4–5.9)
WBC # BLD AUTO: 7.5 10E9/L (ref 4–11)

## 2019-08-27 PROCEDURE — 80061 LIPID PANEL: CPT | Performed by: FAMILY MEDICINE

## 2019-08-27 PROCEDURE — 36415 COLL VENOUS BLD VENIPUNCTURE: CPT | Performed by: FAMILY MEDICINE

## 2019-08-27 PROCEDURE — 85027 COMPLETE CBC AUTOMATED: CPT | Performed by: FAMILY MEDICINE

## 2019-08-27 PROCEDURE — G0439 PPPS, SUBSEQ VISIT: HCPCS | Performed by: FAMILY MEDICINE

## 2019-08-27 PROCEDURE — 80053 COMPREHEN METABOLIC PANEL: CPT | Performed by: FAMILY MEDICINE

## 2019-08-27 RX ORDER — SIMVASTATIN 20 MG
20 TABLET ORAL AT BEDTIME
Qty: 90 TABLET | Refills: 3 | Status: SHIPPED | OUTPATIENT
Start: 2019-08-27 | End: 2020-10-22

## 2019-08-27 ASSESSMENT — ANXIETY QUESTIONNAIRES
1. FEELING NERVOUS, ANXIOUS, OR ON EDGE: NOT AT ALL
2. NOT BEING ABLE TO STOP OR CONTROL WORRYING: NOT AT ALL
5. BEING SO RESTLESS THAT IT IS HARD TO SIT STILL: NOT AT ALL
6. BECOMING EASILY ANNOYED OR IRRITABLE: NOT AT ALL
7. FEELING AFRAID AS IF SOMETHING AWFUL MIGHT HAPPEN: NOT AT ALL
GAD7 TOTAL SCORE: 0
3. WORRYING TOO MUCH ABOUT DIFFERENT THINGS: NOT AT ALL

## 2019-08-27 ASSESSMENT — PATIENT HEALTH QUESTIONNAIRE - PHQ9
SUM OF ALL RESPONSES TO PHQ QUESTIONS 1-9: 6
5. POOR APPETITE OR OVEREATING: NOT AT ALL

## 2019-08-27 ASSESSMENT — MIFFLIN-ST. JEOR: SCORE: 1584.32

## 2019-08-27 NOTE — LETTER
August 29, 2019      [place]  [address]  [facility] [address]        Dear ,    We are writing to inform you of your test results.    Overall, your metabolic panel looked good.  Your blood sugar is ever so slightly high.  Your kidney function is stable.  Your liver function is good.     Your cholesterol panel looks excellent.  Resulted Orders   Comprehensive metabolic panel   Result Value Ref Range    Sodium 142 133 - 144 mmol/L    Potassium 4.8 3.4 - 5.3 mmol/L    Chloride 110 (H) 94 - 109 mmol/L    Carbon Dioxide 26 20 - 32 mmol/L    Anion Gap 6 3 - 14 mmol/L    Glucose 100 (H) 70 - 99 mg/dL    Urea Nitrogen 17 7 - 30 mg/dL    Creatinine 0.92 0.66 - 1.25 mg/dL    GFR Estimate 84 >60 mL/min/[1.73_m2]      Comment:      Non  GFR Calc  Starting 12/18/2018, serum creatinine based estimated GFR (eGFR) will be   calculated using the Chronic Kidney Disease Epidemiology Collaboration   (CKD-EPI) equation.      GFR Estimate If Black >90 >60 mL/min/[1.73_m2]      Comment:       GFR Calc  Starting 12/18/2018, serum creatinine based estimated GFR (eGFR) will be   calculated using the Chronic Kidney Disease Epidemiology Collaboration   (CKD-EPI) equation.      Calcium 9.0 8.5 - 10.1 mg/dL    Bilirubin Total 1.0 0.2 - 1.3 mg/dL    Albumin 4.0 3.4 - 5.0 g/dL    Protein Total 7.1 6.8 - 8.8 g/dL    Alkaline Phosphatase 77 40 - 150 U/L    ALT 24 0 - 70 U/L    AST 19 0 - 45 U/L   Lipid panel reflex to direct LDL Fasting   Result Value Ref Range    Cholesterol 113 <200 mg/dL    Triglycerides 50 <150 mg/dL    HDL Cholesterol 57 >39 mg/dL    LDL Cholesterol Calculated 46 <100 mg/dL      Comment:      Desirable:       <100 mg/dl    Non HDL Cholesterol 56 <130 mg/dL       If you have any questions or concerns, please call the clinic at the number listed above.       Sincerely,      Reinier Santiago MD

## 2019-08-27 NOTE — PROGRESS NOTES
"  SUBJECTIVE:   Mj Meeks is a 69 year old male who presents for Preventive Visit.  Are you in the first 12 months of your Medicare Part B coverage?  No    Physical Health:    In general, how would you rate your overall physical health? good    Outside of work, how many days during the week do you exercise? 4-5 days/week    Outside of work, approximately how many minutes a day do you exercise?45-60 minutes    If you drink alcohol do you typically have >3 drinks per day or >7 drinks per week? No    Do you usually eat at least 4 servings of fruit and vegetables a day, include whole grains & fiber and avoid regularly eating high fat or \"junk\" foods? Yes    Do you have any problems taking medications regularly?  No    Do you have any side effects from medications? none,  Sometimes constipation    Needs assistance for the following daily activities: no assistance needed    Which of the following safety concerns are present in your home?  none identified     Hearing impairment: No    In the past 6 months, have you been bothered by leaking of urine? no    Mental Health:    In general, how would you rate your overall mental or emotional health? good  PHQ-2 Score:      Do you feel safe in your environment? Yes    Do you have a Health Care Directive? No: Advance care planning reviewed with patient; information given to patient to review.    Additional concerns to address? Yes (See Below)     Fall risk:       Cognitive Screenin) Repeat 3 items (Leader, Season, Table)  2) Clock draw: NORMAL  3) 3 item recall: Recalls 3 objects  Results: 3 items recalled: COGNITIVE IMPAIRMENT LESS LIKELY    Mini-CogTM Copyright ALLAN Duenas. Licensed by the author for use in [facility]; reprinted with permission (elisabeth@.[email or web address]). All rights reserved.      Do you have sleep apnea, excessive snoring or daytime drowsiness?: night sleeping so well due to death and other personal matters has stated above    History of asymptomatic " "coronary artery disease, seen on CT. Managed with simvastatin and aspirin.     History of carotid artery bruit (L>R). Asymptomatic. Denies chest pain, shortness of breath. Followed by cardiology.     History of erectile dysfunction. He occasionally uses the sildenafil without significant improvement.     Patient here with concerns about depression. He has had several stressors within the past few years including his sister's death in 2016, divorce in 2017, and the death of his son in 2018. Patient reports difficulties processing these things. He hasn't attended counseling.     Patient drinks several beers per day, reporting \"right around\" six to eight per day.     Reviewed and updated as needed this visit by clinical staff  Tobacco  Allergies  Meds  Med Hx  Surg Hx  Fam Hx  Soc Hx      Reviewed and updated as needed this visit by Provider        Social History     Tobacco Use     Smoking status: Former Smoker     Packs/day: 2.00     Years: 20.00     Pack years: 40.00     Smokeless tobacco: Former User     Quit date: 9/23/2012   Substance Use Topics     Alcohol use: Yes     Alcohol/week: 0.0 oz     Comment: 2+ drinks daily     Current providers sharing in care for this patient include:   Patient Care Team:  Reinier Santiago MD as PCP - General (Family Practice)  Reinier Santiago MD as Assigned PCP    The following health maintenance items are reviewed in Epic and correct as of today:  Health Maintenance   Topic Date Due     ZOSTER IMMUNIZATION (2 of 3) 08/12/2015     LUNG CANCER SCREENING ANNUAL  05/13/2016     FALL RISK ASSESSMENT  07/11/2017     MEDICARE ANNUAL WELLNESS VISIT  04/25/2018     PHQ-2  01/01/2019     INFLUENZA VACCINE (1) 09/01/2019     COLONOSCOPY  06/22/2020     ADVANCE CARE PLANNING  06/28/2021     LIPID  04/25/2022     DTAP/TDAP/TD IMMUNIZATION (2 - Td) 10/10/2023     HEPATITIS C SCREENING  Completed     PNEUMOCOCCAL IMMUNIZATION 65+ LOW/MEDIUM RISK  Completed     AORTIC ANEURYSM " SCREENING (SYSTEM ASSIGNED)  Completed     IPV IMMUNIZATION  Aged Out     MENINGITIS IMMUNIZATION  Aged Out     Patient Active Problem List   Diagnosis     CAD (coronary artery disease)     Hyperlipidemia LDL goal <100     RBBB (right bundle branch block)     History of tobacco abuse     Advanced directives, counseling/discussion     Past Surgical History:   Procedure Laterality Date     ENT SURGERY      Tonsillectomy     HERNIA REPAIR  1985       Social History     Tobacco Use     Smoking status: Former Smoker     Packs/day: 2.00     Years: 20.00     Pack years: 40.00     Smokeless tobacco: Former User     Quit date: 9/23/2012   Substance Use Topics     Alcohol use: Yes     Alcohol/week: 0.0 oz     Comment: 2+ drinks daily     Family History   Problem Relation Age of Onset     Diabetes Mother      Cancer Mother      Cardiovascular Father      Heart Disease Father         multiple MI's     Cancer Sister         ovarian cancer     Coronary Artery Disease Early Onset Son         MI     Colon Cancer No family hx of          ROS:  CONSTITUTIONAL: NEGATIVE for fever, chills, change in weight  INTEGUMENTARY/SKIN: NEGATIVE for worrisome rashes, moles or lesions  EYES: NEGATIVE for vision changes or irritation  ENT/MOUTH: NEGATIVE for ear, mouth and throat problems  RESP: NEGATIVE for significant cough or SOB  CV: NEGATIVE for chest pain, palpitations or peripheral edema  GI: NEGATIVE for nausea, abdominal pain, heartburn, or change in bowel habits  : POSITIVE for erectile dysfunction, NEGATIVE for frequency, dysuria, or hematuria  MUSCULOSKELETAL: NEGATIVE for significant arthralgias or myalgia  NEURO: NEGATIVE for weakness, dizziness or paresthesias  PSYCHIATRIC: as noted above     This document serves as a record of the services and decisions personally performed and made by Reinier Santiago MD. It was created on his behalf by Yanet Haley, a trained medical scribe. The creation of this document is based on the  "provider's statements to the medical scribe.  Yanet Haley 10:22 AM August 27, 2019    OBJECTIVE:   /82 (BP Location: Right arm, Patient Position: Chair, Cuff Size: Adult Regular)   Pulse 75   Temp 98.8  F (37.1  C) (Oral)   Resp 16   Ht 1.715 m (5' 7.5\")   Wt 85.3 kg (188 lb)   SpO2 95%   BMI 29.01 kg/m   Estimated body mass index is 29.01 kg/m  as calculated from the following:    Height as of this encounter: 1.715 m (5' 7.5\").    Weight as of this encounter: 85.3 kg (188 lb).  EXAM:   GENERAL: healthy, alert and no distress  EYES: Eyes grossly normal to inspection, PERRL and conjunctivae and sclerae normal  HENT: ear canals and TM's normal, nose and mouth without ulcers or lesions  NECK: no adenopathy, no asymmetry, masses, or scars and thyroid normal to palpation  RESP: lungs clear to auscultation - no rales, rhonchi or wheezes  CV: regular rate and rhythm, normal S1 S2, no S3 or S4, no murmur, click or rub, no peripheral edema and peripheral pulses strong  ABDOMEN: soft, nontender, no hepatosplenomegaly, no masses and bowel sounds normal   (male): normal male genitalia without lesions or urethral discharge, no hernia  MS: no gross musculoskeletal defects noted, no edema  SKIN: no suspicious lesions or rashes  NEURO: Normal strength and tone, mentation intact and speech normal  PSYCH: mentation appears normal, affect normal/bright    No flowsheet data found.    No flowsheet data found.    Diagnostic Test Results:  Labs reviewed in Epic  No results found for this or any previous visit (from the past 24 hour(s)).    ASSESSMENT / PLAN:   1. Encounter for Medicare annual wellness exam    2. Adjustment disorder with depressed mood  - MENTAL HEALTH REFERRAL  - Adult; Outpatient Treatment; Individual/Couples/Family/Group Therapy/Health Psychology; [facility] [place] [facility] to the [facility]: [facility] [phone]; We will contact you to schedule the appointment or please call with any questions    3. Hyperlipidemia LDL " "goal <70  - simvastatin (ZOCOR) 20 MG tablet; Take 1 tablet (20 mg) by mouth At Bedtime  Dispense: 90 tablet; Refill: 3  - Lipid panel reflex to direct LDL Fasting    4. Coronary artery disease involving native coronary artery of native heart without angina pectoris  - Comprehensive metabolic panel  - CBC with platelets    5. Hyperlipidemia LDL goal <100    6. Uncomplicated alcohol dependence (H)  Recommend treatment program and going to AA meetings and having a sponsor.  Patient is not ready to do this at this time.      End of Life Planning:  Patient currently has an advanced directive: No.  I have verified the patient's ablity to prepare an advanced directive/make health care decisions.  Literature was provided to assist patient in preparing an advanced directive.    COUNSELING:  Reviewed preventive health counseling, as reflected in patient instructions    Estimated body mass index is 29.01 kg/m  as calculated from the following:    Height as of this encounter: 1.715 m (5' 7.5\").    Weight as of this encounter: 85.3 kg (188 lb).         reports that he has quit smoking. He has a 40.00 pack-year smoking history. He quit smokeless tobacco use about 6 years ago.      Appropriate preventive services were discussed with this patient, including applicable screening as appropriate for cardiovascular disease, diabetes, osteopenia/osteoporosis, and glaucoma.  As appropriate for age/gender, discussed screening for colorectal cancer, prostate cancer, breast cancer, and cervical cancer. Checklist reviewing preventive services available has been given to the patient.    Reviewed patients plan of care and provided an AVS. The Basic Care Plan (routine screening as documented in Health Maintenance) for Mj meets the Care Plan requirement. This Care Plan has been established and reviewed with the Patient.    Counseling Resources:  ATP IV Guidelines  Pooled Cohorts Equation Calculator  Breast Cancer Risk Calculator  FRAX Risk " Assessment  ICSI Preventive Guidelines  Dietary Guidelines for Americans, 2010  USDA's MyPlate  ASA Prophylaxis  Lung CA Screening    The information in this document, created by the medical scribe for me, accurately reflects the services I personally performed and the decisions made by me. I have reviewed and approved this document for accuracy prior to leaving the patient care area.  August 27, 2019 10:50 AM    Reinier Santiago MD  [facility]

## 2019-08-27 NOTE — PATIENT INSTRUCTIONS
Patient Education   Personalized Prevention Plan  You are due for the preventive services outlined below.  Your care team is available to assist you in scheduling these services.  If you have already completed any of these items, please share that information with your care team to update in your medical record.  Health Maintenance Due   Topic Date Due     Zoster (Shingles) Vaccine (2 of 3) 08/12/2015     Lung Cancer Screening (CT Scan)  05/13/2016     FALL RISK ASSESSMENT  07/11/2017     Annual Wellness Visit  04/25/2018     PHQ-2  01/01/2019

## 2019-08-28 LAB
ALBUMIN SERPL-MCNC: 4 G/DL (ref 3.4–5)
ALP SERPL-CCNC: 77 U/L (ref 40–150)
ALT SERPL W P-5'-P-CCNC: 24 U/L (ref 0–70)
ANION GAP SERPL CALCULATED.3IONS-SCNC: 6 MMOL/L (ref 3–14)
AST SERPL W P-5'-P-CCNC: 19 U/L (ref 0–45)
BILIRUB SERPL-MCNC: 1 MG/DL (ref 0.2–1.3)
BUN SERPL-MCNC: 17 MG/DL (ref 7–30)
CALCIUM SERPL-MCNC: 9 MG/DL (ref 8.5–10.1)
CHLORIDE SERPL-SCNC: 110 MMOL/L (ref 94–109)
CHOLEST SERPL-MCNC: 113 MG/DL
CO2 SERPL-SCNC: 26 MMOL/L (ref 20–32)
CREAT SERPL-MCNC: 0.92 MG/DL (ref 0.66–1.25)
GFR SERPL CREATININE-BSD FRML MDRD: 84 ML/MIN/{1.73_M2}
GLUCOSE SERPL-MCNC: 100 MG/DL (ref 70–99)
HDLC SERPL-MCNC: 57 MG/DL
LDLC SERPL CALC-MCNC: 46 MG/DL
NONHDLC SERPL-MCNC: 56 MG/DL
POTASSIUM SERPL-SCNC: 4.8 MMOL/L (ref 3.4–5.3)
PROT SERPL-MCNC: 7.1 G/DL (ref 6.8–8.8)
SODIUM SERPL-SCNC: 142 MMOL/L (ref 133–144)
TRIGL SERPL-MCNC: 50 MG/DL

## 2019-08-28 ASSESSMENT — ANXIETY QUESTIONNAIRES: GAD7 TOTAL SCORE: 0

## 2019-10-01 ENCOUNTER — ALLIED HEALTH/NURSE VISIT (OUTPATIENT)
Dept: NURSING | Facility: CLINIC | Age: 69
End: 2019-10-01
Payer: COMMERCIAL

## 2019-10-01 DIAGNOSIS — Z23 ENCOUNTER FOR IMMUNIZATION: Primary | ICD-10-CM

## 2019-10-01 DIAGNOSIS — Z23 NEED FOR PROPHYLACTIC VACCINATION AND INOCULATION AGAINST INFLUENZA: ICD-10-CM

## 2019-10-01 PROCEDURE — 90750 HZV VACC RECOMBINANT IM: CPT

## 2019-10-01 PROCEDURE — G0008 ADMIN INFLUENZA VIRUS VAC: HCPCS | Mod: 59

## 2019-10-01 PROCEDURE — 90662 IIV NO PRSV INCREASED AG IM: CPT

## 2019-10-01 PROCEDURE — 90471 IMMUNIZATION ADMIN: CPT

## 2019-12-23 ENCOUNTER — ALLIED HEALTH/NURSE VISIT (OUTPATIENT)
Dept: NURSING | Facility: CLINIC | Age: 69
End: 2019-12-23
Payer: COMMERCIAL

## 2019-12-23 DIAGNOSIS — Z23 ENCOUNTER FOR IMMUNIZATION: Primary | ICD-10-CM

## 2019-12-23 PROCEDURE — 99207 ZZC NO CHARGE NURSE ONLY: CPT

## 2019-12-23 PROCEDURE — 90471 IMMUNIZATION ADMIN: CPT

## 2019-12-23 PROCEDURE — 90750 HZV VACC RECOMBINANT IM: CPT

## 2020-02-25 DIAGNOSIS — N52.9 ERECTILE DYSFUNCTION, UNSPECIFIED ERECTILE DYSFUNCTION TYPE: ICD-10-CM

## 2020-02-25 RX ORDER — SILDENAFIL 100 MG/1
100 TABLET, FILM COATED ORAL DAILY PRN
Qty: 50 TABLET | Refills: 0 | Status: SHIPPED | OUTPATIENT
Start: 2020-02-25 | End: 2020-02-25

## 2020-02-25 RX ORDER — SILDENAFIL 100 MG/1
100 TABLET, FILM COATED ORAL DAILY PRN
Qty: 50 TABLET | Refills: 0 | Status: SHIPPED | OUTPATIENT
Start: 2020-02-25 | End: 2021-09-30

## 2020-03-23 ENCOUNTER — VIRTUAL VISIT (OUTPATIENT)
Dept: CARDIOLOGY | Facility: CLINIC | Age: 70
End: 2020-03-23
Payer: COMMERCIAL

## 2020-03-23 ENCOUNTER — TELEPHONE (OUTPATIENT)
Dept: FAMILY MEDICINE | Facility: CLINIC | Age: 70
End: 2020-03-23

## 2020-03-23 ENCOUNTER — ALLIED HEALTH/NURSE VISIT (OUTPATIENT)
Dept: NURSING | Facility: CLINIC | Age: 70
End: 2020-03-23
Payer: COMMERCIAL

## 2020-03-23 VITALS
DIASTOLIC BLOOD PRESSURE: 72 MMHG | WEIGHT: 188 LBS | HEART RATE: 73 BPM | BODY MASS INDEX: 28.49 KG/M2 | SYSTOLIC BLOOD PRESSURE: 120 MMHG | HEIGHT: 68 IN

## 2020-03-23 VITALS — DIASTOLIC BLOOD PRESSURE: 72 MMHG | HEART RATE: 73 BPM | SYSTOLIC BLOOD PRESSURE: 120 MMHG

## 2020-03-23 DIAGNOSIS — Z01.30 BP CHECK: Primary | ICD-10-CM

## 2020-03-23 DIAGNOSIS — I65.22 STENOSIS OF LEFT CAROTID ARTERY: ICD-10-CM

## 2020-03-23 DIAGNOSIS — I25.83 CORONARY ARTERY DISEASE DUE TO LIPID RICH PLAQUE: Primary | ICD-10-CM

## 2020-03-23 DIAGNOSIS — I25.10 CORONARY ARTERY DISEASE DUE TO LIPID RICH PLAQUE: Primary | ICD-10-CM

## 2020-03-23 PROCEDURE — 99441 ZZC PHYSICIAN TELEPHONE EVALUATION 5-10 MIN: CPT | Performed by: INTERNAL MEDICINE

## 2020-03-23 ASSESSMENT — MIFFLIN-ST. JEOR: SCORE: 1584.64

## 2020-03-23 NOTE — PROGRESS NOTES
"Mj Meeks is a 69 year old male who is being evaluated via a billable telephone visit.      The patient has been notified of following:     \"This telephone visit will be conducted via a call between you and your physician/provider. We have found that certain health care needs can be provided without the need for a physical exam.  This service lets us provide the care you need with a short phone conversation.  If a prescription is necessary we can send it directly to your pharmacy.  If lab work is needed we can place an order for that and you can then stop by our lab to have the test done at a later time.    If during the course of the call the physician/provider feels a telephone visit is not appropriate, you will not be charged for this service.\"     Mj Meeks complains of    Chief Complaint   Patient presents with     Annual Visit     Annual F/U. CAD, HLD, Carotid Bruits       I have reviewed and updated the patient's Past Medical History, Social History, Family History and Medication List.    ALLERGIES  Patient has no known allergies.     Review Of Systems  Skin: negative  Eyes: negative, glasses  Ears/Nose/Throat: negative  Respiratory: No shortness of breath, dyspnea on exertion, cough, or hemoptysis  Cardiovascular: negative  Gastrointestinal: negative  Genitourinary: negative  Musculoskeletal: negative  Neurologic: negative  Psychiatric: negative  Hematologic/Lymphatic/Immunologic: negative  Endocrine: negative    Ok to call patient in th afternoon    Patient reported vitals:  BP: 120/72  Heart rate:  73  Weight: 188 lbs    Tasha Johnson CMA      Assessment/Plan:  I have been following the patient for hyperlipidemia, vascular calcification and mild carotid artery disease.  I have reviewed his last carotid ultrasound from 2019 and there is mild to moderate left internal carotid artery disease.  His blood pressure taken today at home was 120/72 with heart rate 73.  He feels pretty well.  He denies " any dyspnea on exertion or change in his functional status.    We discussed following up in 6 months to a year, possibly with stress echocardiogram if he feels reduced exercise tolerance.      Phone call duration:  5  minutes    Ashok Little MD

## 2020-03-23 NOTE — TELEPHONE ENCOUNTER
"VS w/ IP 3/23/2020 8/27/2019 8/27/2019 11/21/2018   SYSTOLIC 120 126  130   DIASTOLIC 72 82  76   PULSE 73 75  60   TEMPERATURE  98.8     RESPIRATIONS  16     Wt.(Lbs.)  188  176   Wt. (Kg)  85.276 kg  79.833 kg   Ht. (Feet./Inches)  5' 7.5\"  5' 7\"   Ht. (Cm)  171.5 cm  170.2 cm   BMI   29.01    O2 Sat  95         Pt sat for 10 minutes on 3/23/20 at 7am. Is is doing a phone visit with the heart dr at 10am, just wanted his BP checked. NO call is needed to pt.Oniel Trimble CMA    "

## 2020-03-23 NOTE — PROGRESS NOTES
Problem: Patient Care Overview  Goal: Plan of Care Review  Outcome: Ongoing (interventions implemented as appropriate)  Pt doing well. Family has  No question/concerns at this time. Family Agrees with poc. No pain/falls.  [place] called pt brother today to give him an update. Ct done today per one eye pupil was a little bigger than the other. Ct negative for anything new. Pt ate breakfast in chair today. He ate pudding and applesauce with booast pudding for lunch. Pt is watching cartoons in room. Iv abx discontinued d/t it has been 10days already. Pt is sound a lot better today  . Family does not want peg at this time        See note from today

## 2020-06-08 ENCOUNTER — CARE COORDINATION (OUTPATIENT)
Dept: CARDIOLOGY | Facility: CLINIC | Age: 70
End: 2020-06-08

## 2020-06-10 NOTE — PROGRESS NOTES
Anabel, MD Savita Alaniz Elizabeth, RN 17 hours ago (3:02 PM)       I would stay on the aspirin (antiplatelet). I don t think he s on anticoagulation.    Message text      Reviewed Dr. Little's recommendations with patient. Patient verbalized understanding and agreed with plan of care. No further questions.

## 2020-09-24 ENCOUNTER — DOCUMENTATION ONLY (OUTPATIENT)
Dept: CARDIOLOGY | Facility: CLINIC | Age: 70
End: 2020-09-24

## 2020-09-24 ENCOUNTER — ALLIED HEALTH/NURSE VISIT (OUTPATIENT)
Dept: FAMILY MEDICINE | Facility: CLINIC | Age: 70
End: 2020-09-24
Payer: COMMERCIAL

## 2020-09-24 VITALS — HEART RATE: 54 BPM | SYSTOLIC BLOOD PRESSURE: 130 MMHG | DIASTOLIC BLOOD PRESSURE: 70 MMHG

## 2020-09-24 DIAGNOSIS — Z23 NEED FOR PROPHYLACTIC VACCINATION AND INOCULATION AGAINST INFLUENZA: Primary | ICD-10-CM

## 2020-09-24 PROCEDURE — 90662 IIV NO PRSV INCREASED AG IM: CPT

## 2020-09-24 PROCEDURE — 99207 ZZC NO CHARGE NURSE ONLY: CPT

## 2020-09-24 PROCEDURE — G0008 ADMIN INFLUENZA VIRUS VAC: HCPCS

## 2020-09-24 NOTE — PROGRESS NOTES
Mj Meeks is a 70 year old patient who comes in today for a Blood Pressure check.  Initial BP:  /70 (BP Location: Right arm, Patient Position: Sitting, Cuff Size: Adult Regular)   Pulse 54      54[irregular[  Disposition: Brent Fleming involved due to irregular heart beat.    Rickey Gonzalez CMA

## 2020-09-28 NOTE — PROGRESS NOTES
If feeling okay without fatigue and / or lightheaded / passing out, then okay to follow up as previously recommended thanks! If he s concerned at all we are happy to see him!    Message text      LMTRC   Please give message above from cardiologist    Riki Fleming RN, BSN

## 2020-10-22 DIAGNOSIS — E78.5 HYPERLIPIDEMIA LDL GOAL <70: ICD-10-CM

## 2020-10-22 RX ORDER — SIMVASTATIN 20 MG
20 TABLET ORAL AT BEDTIME
Qty: 30 TABLET | Refills: 0 | Status: SHIPPED | OUTPATIENT
Start: 2020-10-22 | End: 2020-11-23

## 2020-10-22 NOTE — TELEPHONE ENCOUNTER
Patient has upcoming appointment. 30 day supply given at this time.     Maureen Campuzano RN Flex

## 2020-11-12 NOTE — PROGRESS NOTES
Pre-Visit Planning   11/16/2020  10:00am  Dr. Santiago    Appointment Notes for this encounter:   add AWV? JQ // med check    Questionnaires Reviewed/Assigned  No additional questionnaires are needed        Patient preferred phone number: [phone]    Unable to reach. Left voicemail. Advised patient to call clinic back at [phone].

## 2020-11-16 ENCOUNTER — OFFICE VISIT (OUTPATIENT)
Dept: FAMILY MEDICINE | Facility: CLINIC | Age: 70
End: 2020-11-16
Payer: COMMERCIAL

## 2020-11-16 VITALS
HEIGHT: 67 IN | RESPIRATION RATE: 20 BRPM | BODY MASS INDEX: 26.06 KG/M2 | WEIGHT: 166 LBS | HEART RATE: 76 BPM | TEMPERATURE: 97.7 F | DIASTOLIC BLOOD PRESSURE: 82 MMHG | SYSTOLIC BLOOD PRESSURE: 124 MMHG

## 2020-11-16 DIAGNOSIS — R01.1 HEART MURMUR: ICD-10-CM

## 2020-11-16 DIAGNOSIS — M76.61 ACHILLES TENDINITIS OF RIGHT LOWER EXTREMITY: Primary | ICD-10-CM

## 2020-11-16 DIAGNOSIS — F10.20 UNCOMPLICATED ALCOHOL DEPENDENCE (H): ICD-10-CM

## 2020-11-16 PROCEDURE — 99214 OFFICE O/P EST MOD 30 MIN: CPT | Performed by: FAMILY MEDICINE

## 2020-11-16 ASSESSMENT — MIFFLIN-ST. JEOR: SCORE: 1471.6

## 2020-11-16 NOTE — PROGRESS NOTES
"Leyda Meeks is a 70 year old male who presents to clinic today for the following health issues:    HPI         {ACUTE SUPERLIST - extended history:867357}  Annual Wellness Visit  {Split Bill scripting  The purpose of this visit is to discuss your medical history and prevent health problems before you are sick. You may be responsible for a co-pay, coinsurance, or deductible if your visit today includes services such as checking on a sore throat, having an x-ray or lab test, or treating and evaluating a new or existing condition :071398}  Patient has been advised of split billing requirements and indicates understanding: {Yes and No:472376}     Are you in the first 12 months of your Medicare Part B coverage?  { :225794::\"No\"}    Physical Health:    In general, how would you rate your overall physical health? { :615324}    Outside of work, how many days during the week do you exercise?{ :603598}    Outside of work, approximately how many minutes a day do you exercise?{ :772429}    If you drink alcohol do you typically have >3 drinks per day or >7 drinks per week? { :847795}    Do you usually eat at least 4 servings of fruit and vegetables a day, include whole grains & fiber and avoid regularly eating high fat or \"junk\" foods? { :717404::\"Yes\"}    Do you have any problems taking medications regularly? { :457761::\"No\"}    Do you have any side effects from medications? { :811929}    Needs assistance for the following daily activities: { :120341}    Which of the following safety concerns are present in your home?  { :860958::\"none identified\"}     Hearing impairment: { :742936}    In the past 6 months, have you been bothered by leaking of urine? { :035837}    Mental Health:    In general, how would you rate your overall mental or emotional health? { :695186}  PHQ-2 Score:      Do you feel safe in your environment? { :523990}    Have you ever done Advance Care Planning? (For example, a Health Directive, " "POLST, or a discussion with a medical provider or your loved ones about your wishes)? { :782546}    Fall risk:  { :396257}  {If any of the above assessments are answered yes, consider ordering appropriate referrals (Optional):705721::\"click delete button to remove this line now\"}  Cognitive Screening: { :249132}    {Do you have sleep apnea, excessive snoring or daytime drowsiness? (Optional):827863}    Current providers sharing in care for this patient include: {Rooming staff:  Please update Care Team in Rooming Activity, refresh this note and then delete this statement}  Patient Care Team:  Reinier Santiago MD as PCP - General (Family Practice)  Reinier Santiago MD as Assigned PCP  Ashok Little MD as Assigned Heart and Vascular Provider    Patient has been advised of split billing requirements and indicates understanding: {YES / NO:688580::\"Yes\"}  {additonal problems for provider to add (Optional):069777}    Review of Systems   {ROS COMP (Optional):252081}      Objective    There were no vitals taken for this visit.  There is no height or weight on file to calculate BMI.  Physical Exam   {Exam List (Optional):112677}    {Diagnostic Test Results (Optional):784620}        {PROVIDER CHARTING PREFERENCE:222596}      Answers for HPI/ROS submitted by the patient on 11/16/2020   Chronic problems general questions HPI Form  Are you regularly taking any medication or supplement to lower your cholesterol?: Yes  Are you having muscle aches or other side effects that you think could be caused by your cholesterol lowering medication?: No    "

## 2020-11-16 NOTE — PROGRESS NOTES
"Leyda Meeks is a 70 year old male who presents to clinic today for the following health issues:    HPI         Musculoskeletal problem/pain  Onset/Duration: Early March 2020, walking in [place] parking lot. Noticed it after waking up next morning.  Description  Location: Ankle - right  Joint Swelling: YES - constant  Redness: YES  Pain: YES- every step  Warmth: YES  Intensity:  7/10 at worst, 0/10 at rest  Progression of Symptoms:  worsening  Accompanying signs and symptoms:   Fevers: no  Numbness/tingling/weakness: YES- weakness  History  Trauma to the area: YES- high ankle sprains in college football  Recent illness:  no  Previous similar problem: no  Previous evaluation:  no  Precipitating or alleviating factors:  Aggravating factors include: walking and overuse  Therapies tried and outcome: rest/inactivity and ice      Review of Systems   CV: NEGATIVE for chest pain, palpitations or peripheral edema  MUSCULOSKELETAL: as above      Objective    /82 (BP Location: Right arm, Patient Position: Sitting, Cuff Size: Adult Regular)   Pulse 76   Temp 97.7  F (36.5  C) (Oral)   Resp 20   Ht 1.702 m (5' 7\")   Wt 75.3 kg (166 lb)   BMI 26.00 kg/m    Body mass index is 26 kg/m .  Physical Exam   GENERAL: healthy, alert and no distress  CV: regular rates and rhythm, S4 present and no peripheral edema  MS: Right Achilles with swelling and tenderness throughout, normal plantarflexion against resistance, normal dorsiflexion, normal range of motion, no erythema, Choi test normal        Assessment & Plan     Achilles tendinitis of right lower extremity  Discussed chronic Achilles tendinopathy.  May have had partial tear by the way he describes initial injury but that was several months ago and has normal Choi test at this time and able to walk.  Recommend treating conservatively with walking boot all the time.  Follow-up in 1 month.  Consider podiatry or MRI if not improving.  - Ankle/Foot " "Br[employer] Supplies Order for DME - ONLY FOR DME    Uncomplicated alcohol dependence (H)  Recommend decreasing alcohol intake with current drinking habits more than recommended.  Discussed the health consequences of heavy alcohol use.  Discussed that if he is unable to decrease alcohol use consider AA or other treatment program.      Heart murmur  Patient with abnormal heart auscultation with gallop noted, recommend echocardiogram.  - Echocardiogram Complete; Future     BMI:   Estimated body mass index is 26 kg/m  as calculated from the following:    Height as of this encounter: 1.702 m (5' 7\").    Weight as of this encounter: 75.3 kg (166 lb).            Return in about 1 month (around 12/16/2020) for medicare wellness check, follow-up achilles.    Reinier Santiago MD  [facility]      "

## 2020-11-23 DIAGNOSIS — E78.5 HYPERLIPIDEMIA LDL GOAL <70: ICD-10-CM

## 2020-11-23 RX ORDER — SIMVASTATIN 20 MG
20 TABLET ORAL AT BEDTIME
Qty: 90 TABLET | Refills: 3 | Status: SHIPPED | OUTPATIENT
Start: 2020-11-23 | End: 2021-02-05

## 2020-12-15 ENCOUNTER — HOSPITAL ENCOUNTER (OUTPATIENT)
Dept: CARDIOLOGY | Facility: CLINIC | Age: 70
Discharge: HOME | End: 2020-12-15
Attending: FAMILY MEDICINE | Admitting: FAMILY MEDICINE
Payer: COMMERCIAL

## 2020-12-15 DIAGNOSIS — R01.1 HEART MURMUR: ICD-10-CM

## 2020-12-15 PROCEDURE — 93306 TTE W/DOPPLER COMPLETE: CPT

## 2020-12-15 PROCEDURE — 93306 TTE W/DOPPLER COMPLETE: CPT | Mod: 26 | Performed by: INTERNAL MEDICINE

## 2020-12-15 NOTE — PROGRESS NOTES
"Pre-Visit Planning   Next 5 appointments (look out 90 days)    Dec 17, 2020   Arrive by 11:10 AM  Office Visit with Reinier Santiago MD  [facility] [address] [facility] [address] [phone]        Appointment Notes for this encounter:   reviewed JQ // Wellness visit, injury fu    Questionnaires Reviewed/Assigned  No additional questionnaires are needed       Patient preferred phone number: [phone]      Spoke to patient via phone. Patient does not have additional questions or concerns.        Visit is not preventive.    Patient is established.    Patient reminded of date and time.  Chief complaint confirmed.    Health Maintenance Due   Topic Date Due     ANNUAL REVIEW OF HM ORDERS  1950     LUNG CANCER SCREENING ANNUAL  05/13/2016     PHQ-2  01/01/2020     COLORECTAL CANCER SCREENING  06/22/2020     MEDICARE ANNUAL WELLNESS VISIT  08/27/2020     FALL RISK ASSESSMENT  08/27/2020     [employer]  Patient is active on [employer].    Questionnaire Review   Advised patient to arrive early in order to complete questionnaires.    Call Summary  \"Thank you for your time today.  If anything comes up before your appointment, please feel free to contact us at [phone].\"    "

## 2020-12-17 ENCOUNTER — OFFICE VISIT (OUTPATIENT)
Dept: FAMILY MEDICINE | Facility: CLINIC | Age: 70
End: 2020-12-17
Payer: COMMERCIAL

## 2020-12-17 VITALS
DIASTOLIC BLOOD PRESSURE: 66 MMHG | RESPIRATION RATE: 20 BRPM | SYSTOLIC BLOOD PRESSURE: 150 MMHG | WEIGHT: 173 LBS | OXYGEN SATURATION: 96 % | HEIGHT: 68 IN | TEMPERATURE: 97.7 F | BODY MASS INDEX: 26.22 KG/M2 | HEART RATE: 39 BPM

## 2020-12-17 DIAGNOSIS — E78.5 HYPERLIPIDEMIA LDL GOAL <100: ICD-10-CM

## 2020-12-17 DIAGNOSIS — Z12.11 SCREEN FOR COLON CANCER: ICD-10-CM

## 2020-12-17 DIAGNOSIS — I25.10 CORONARY ARTERY DISEASE INVOLVING NATIVE CORONARY ARTERY OF NATIVE HEART WITHOUT ANGINA PECTORIS: ICD-10-CM

## 2020-12-17 DIAGNOSIS — F10.20 UNCOMPLICATED ALCOHOL DEPENDENCE (H): ICD-10-CM

## 2020-12-17 DIAGNOSIS — Z00.00 ENCOUNTER FOR MEDICARE ANNUAL WELLNESS EXAM: Primary | ICD-10-CM

## 2020-12-17 DIAGNOSIS — I34.0 NONRHEUMATIC MITRAL VALVE REGURGITATION: ICD-10-CM

## 2020-12-17 DIAGNOSIS — E78.5 HYPERLIPIDEMIA LDL GOAL <70: ICD-10-CM

## 2020-12-17 LAB
ERYTHROCYTE [DISTWIDTH] IN BLOOD BY AUTOMATED COUNT: 12.7 % (ref 10–15)
HCT VFR BLD AUTO: 41 % (ref 40–53)
HGB BLD-MCNC: 13.8 G/DL (ref 13.3–17.7)
MCH RBC QN AUTO: 31.1 PG (ref 26.5–33)
MCHC RBC AUTO-ENTMCNC: 33.7 G/DL (ref 31.5–36.5)
MCV RBC AUTO: 92 FL (ref 78–100)
PLATELET # BLD AUTO: 204 10E9/L (ref 150–450)
RBC # BLD AUTO: 4.44 10E12/L (ref 4.4–5.9)
WBC # BLD AUTO: 7.2 10E9/L (ref 4–11)

## 2020-12-17 PROCEDURE — 80061 LIPID PANEL: CPT | Performed by: FAMILY MEDICINE

## 2020-12-17 PROCEDURE — 36415 COLL VENOUS BLD VENIPUNCTURE: CPT | Performed by: FAMILY MEDICINE

## 2020-12-17 PROCEDURE — 99397 PER PM REEVAL EST PAT 65+ YR: CPT | Performed by: FAMILY MEDICINE

## 2020-12-17 PROCEDURE — 85027 COMPLETE CBC AUTOMATED: CPT | Performed by: FAMILY MEDICINE

## 2020-12-17 PROCEDURE — 80053 COMPREHEN METABOLIC PANEL: CPT | Performed by: FAMILY MEDICINE

## 2020-12-17 ASSESSMENT — MIFFLIN-ST. JEOR: SCORE: 1519.22

## 2020-12-17 NOTE — LETTER
December 18, 2020      Mj JAVED Constantino  [address]  [facility] [address]        Dear ,    We are writing to inform you of your test results.    Your lab results look stable; everything is normal.     Resulted Orders   Comprehensive metabolic panel (BMP + Alb, Alk Phos, ALT, AST, Total. Bili, TP)   Result Value Ref Range    Sodium 137 133 - 144 mmol/L    Potassium 4.1 3.4 - 5.3 mmol/L    Chloride 105 94 - 109 mmol/L    Carbon Dioxide 28 20 - 32 mmol/L    Anion Gap 4 3 - 14 mmol/L    Glucose 86 70 - 99 mg/dL    Urea Nitrogen 16 7 - 30 mg/dL    Creatinine 0.91 0.66 - 1.25 mg/dL    GFR Estimate 85 >60 mL/min/[1.73_m2]      Comment:      Non  GFR Calc  Starting 12/18/2018, serum creatinine based estimated GFR (eGFR) will be   calculated using the Chronic Kidney Disease Epidemiology Collaboration   (CKD-EPI) equation.      GFR Estimate If Black >90 >60 mL/min/[1.73_m2]      Comment:       GFR Calc  Starting 12/18/2018, serum creatinine based estimated GFR (eGFR) will be   calculated using the Chronic Kidney Disease Epidemiology Collaboration   (CKD-EPI) equation.      Calcium 8.8 8.5 - 10.1 mg/dL    Bilirubin Total 1.3 0.2 - 1.3 mg/dL    Albumin 4.0 3.4 - 5.0 g/dL    Protein Total 7.3 6.8 - 8.8 g/dL    Alkaline Phosphatase 100 40 - 150 U/L    ALT 25 0 - 70 U/L    AST 25 0 - 45 U/L   CBC with platelets   Result Value Ref Range    WBC 7.2 4.0 - 11.0 10e9/L    RBC Count 4.44 4.4 - 5.9 10e12/L    Hemoglobin 13.8 13.3 - 17.7 g/dL    Hematocrit 41.0 40.0 - 53.0 %    MCV 92 78 - 100 fl    MCH 31.1 26.5 - 33.0 pg    MCHC 33.7 31.5 - 36.5 g/dL    RDW 12.7 10.0 - 15.0 %    Platelet Count 204 150 - 450 10e9/L   Lipid panel reflex to direct LDL Fasting   Result Value Ref Range    Cholesterol 139 <200 mg/dL    Triglycerides 49 <150 mg/dL    HDL Cholesterol 81 >39 mg/dL    LDL Cholesterol Calculated 48 <100 mg/dL      Comment:      Desirable:       <100 mg/dl    Non HDL Cholesterol 58  <130 mg/dL       If you have any questions or concerns, please call the clinic at the number listed above.       Sincerely,      Reinier Santiago MD

## 2020-12-17 NOTE — PROGRESS NOTES
"Leyda Meeks is a 70 year old male who presents to clinic today for the following health issues:    History of Present Illness       He eats 4 or more servings of fruits and vegetables daily.He consumes 0 sweetened beverage(s) daily.He exercises with enough effort to increase his heart rate 60 or more minutes per day.  He exercises with enough effort to increase his heart rate 7 days per week.   He is taking medications regularly.            Annual Wellness Visit    Patient has been advised of split billing requirements and indicates understanding: Yes     Are you in the first 12 months of your Medicare Part B coverage?  No    Physical Health:    In general, how would you rate your overall physical health? good    Outside of work, how many days during the week do you exercise?6-7 days/week    Outside of work, approximately how many minutes a day do you exercise?greater than 60 minutes  If you drink alcohol do you typically have >3 drinks per day or >7 drinks per week? Yes - AUDIT SCORE:     No flowsheet data found.    Do you usually eat at least 4 servings of fruit and vegetables a day, include whole grains & fiber and avoid regularly eating high fat or \"junk\" foods? Yes    Do you have any problems taking medications regularly? No    Do you have any side effects from medications? none    Needs assistance for the following daily activities: no assistance needed    Which of the following safety concerns are present in your home?  none identified     Hearing impairment: No    In the past 6 months, have you been bothered by leaking of urine? yes    Mental Health:    In general, how would you rate your overall mental or emotional health? good  PHQ-2 Score:  0    Do you feel safe in your environment? Yes    Have you ever done Advance Care Planning? (For example, a Health Directive, POLST, or a discussion with a medical provider or your loved ones about your wishes)? No, advance care planning information " "given to patient to review.  Patient declined advance care planning discussion at this time.    Fall risk:  Fallen 2 or more times in the past year?: No  Any fall with injury in the past year?: No    Cognitive Screenin) Repeat 3 items (Leader, Season, Table)    2) Clock draw: NORMAL  3) 3 item recall: Recalls 3 objects  Results: 3 items recalled: COGNITIVE IMPAIRMENT LESS LIKELY    Mini-CogTM Copyright S Henrique. Licensed by the author for use in [facility]; reprinted with permission (elisabeth@[email or web address]). All rights reserved.      Do you have sleep apnea, excessive snoring or daytime drowsiness?: yes    Current providers sharing in care for this patient include:  Patient Care Team:  Reinier Santiago MD as PCP - General (Family Practice)  Reinier Santiago MD as Assigned PCP  Ashok Little MD as Assigned Heart and Vascular Provider    Patient has been advised of split billing requirements and indicates understanding: Yes    Review of Systems   Constitutional, HEENT, cardiovascular, pulmonary, gi and gu systems are negative, except as otherwise noted.      Objective    BP (!) 140/70 (BP Location: Right arm, Patient Position: Sitting, Cuff Size: Adult Regular)   Pulse (!) 39   Temp 97.7  F (36.5  C) (Oral)   Resp 20   Ht 1.727 m (5' 8\")   Wt 78.5 kg (173 lb)   SpO2 96%   BMI 26.30 kg/m    Body mass index is 26.3 kg/m .  Physical Exam   GENERAL: healthy, alert and no distress  EYES: Eyes grossly normal to inspection, PERRL and conjunctivae and sclerae normal  HENT: ear canals and TM's normal, nose and mouth without ulcers or lesions  NECK: no adenopathy, no asymmetry, masses, or scars and thyroid normal to palpation  RESP: lungs clear to auscultation - no rales, rhonchi or wheezes  CV: frequent premature beats, grade 2/6 systolic murmur heard best over the LLSB, peripheral pulses strong and no peripheral edema  ABDOMEN: soft, nontender, no hepatosplenomegaly, no masses and bowel sounds " "normal  MS: no gross musculoskeletal defects noted, no edema  SKIN: no suspicious lesions or rashes  NEURO: Normal strength and tone, mentation intact and speech normal  PSYCH: mentation appears normal, affect normal/bright        Assessment & Plan     Encounter for Medicare annual wellness exam    Nonrheumatic mitral valve regurgitation  Discussed monitoring with mild to moderate mitral valve regurgitation.  Patient is moving to [place] and will need to have follow-up there planned.    Screen for colon cancer  Discussed options for colon cancer screening and recommend colonoscopy, flexible sigmoidoscopy with fecal occult blood testing, or yearly fecal occult blood testing.  Patient prefers colonoscopy option, referral ordered.  - GASTROENTEROLOGY ADULT REF PROCEDURE ONLY; Future    Coronary artery disease involving native coronary artery of native heart without angina pectoris  - Comprehensive metabolic panel (BMP + Alb, Alk Phos, ALT, AST, Total. Bili, TP)  - CBC with platelets    Hyperlipidemia LDL goal <100  - Lipid panel reflex to direct LDL Fasting    Uncomplicated alcohol dependence (H)    Hyperlipidemia LDL goal <70       BMI:   Estimated body mass index is 26.3 kg/m  as calculated from the following:    Height as of this encounter: 1.727 m (5' 8\").    Weight as of this encounter: 78.5 kg (173 lb).                Return in about 53 weeks (around 12/23/2021) for Annual Wellness Visit.    Reinier Santiago MD  [facility]      "

## 2020-12-17 NOTE — PATIENT INSTRUCTIONS
Patient Education   Personalized Prevention Plan  You are due for the preventive services outlined below.  Your care team is available to assist you in scheduling these services.  If you have already completed any of these items, please share that information with your care team to update in your medical record.  Health Maintenance Due   Topic Date Due     ANNUAL REVIEW OF HM ORDERS  1950     Lung Cancer Screening (CT Scan)  05/13/2016     PHQ-2  01/01/2020     Colorectal Cancer Screening  06/22/2020     Annual Wellness Visit  08/27/2020     FALL RISK ASSESSMENT  08/27/2020

## 2020-12-18 LAB
ALBUMIN SERPL-MCNC: 4 G/DL (ref 3.4–5)
ALP SERPL-CCNC: 100 U/L (ref 40–150)
ALT SERPL W P-5'-P-CCNC: 25 U/L (ref 0–70)
ANION GAP SERPL CALCULATED.3IONS-SCNC: 4 MMOL/L (ref 3–14)
AST SERPL W P-5'-P-CCNC: 25 U/L (ref 0–45)
BILIRUB SERPL-MCNC: 1.3 MG/DL (ref 0.2–1.3)
BUN SERPL-MCNC: 16 MG/DL (ref 7–30)
CALCIUM SERPL-MCNC: 8.8 MG/DL (ref 8.5–10.1)
CHLORIDE SERPL-SCNC: 105 MMOL/L (ref 94–109)
CHOLEST SERPL-MCNC: 139 MG/DL
CO2 SERPL-SCNC: 28 MMOL/L (ref 20–32)
CREAT SERPL-MCNC: 0.91 MG/DL (ref 0.66–1.25)
GFR SERPL CREATININE-BSD FRML MDRD: 85 ML/MIN/{1.73_M2}
GLUCOSE SERPL-MCNC: 86 MG/DL (ref 70–99)
HDLC SERPL-MCNC: 81 MG/DL
LDLC SERPL CALC-MCNC: 48 MG/DL
NONHDLC SERPL-MCNC: 58 MG/DL
POTASSIUM SERPL-SCNC: 4.1 MMOL/L (ref 3.4–5.3)
PROT SERPL-MCNC: 7.3 G/DL (ref 6.8–8.8)
SODIUM SERPL-SCNC: 137 MMOL/L (ref 133–144)
TRIGL SERPL-MCNC: 49 MG/DL

## 2020-12-18 NOTE — RESULT ENCOUNTER NOTE
Please generate lab letter with comments below:  - Your lab results look stable; everything is normal.

## 2020-12-28 ENCOUNTER — TELEPHONE (OUTPATIENT)
Dept: FAMILY MEDICINE | Facility: CLINIC | Age: 70
End: 2020-12-28

## 2020-12-28 NOTE — TELEPHONE ENCOUNTER
Patient calling to get copy of colonoscopy results from 5 years ago, concerned insurance will not cover the cost of this procedure. If there were polyps found, patient is claiming insurance will cover this cost as preventative. Patient inquiring flexible sigmoidoscopy as possible alternative.

## 2021-01-01 DIAGNOSIS — Z11.59 ENCOUNTER FOR SCREENING FOR OTHER VIRAL DISEASES: Primary | ICD-10-CM

## 2021-01-01 NOTE — TELEPHONE ENCOUNTER
Patient called and provided with number to [facility] Gastro department. Will follow up to make sure records get sent.

## 2021-01-06 ENCOUNTER — TELEPHONE (OUTPATIENT)
Dept: FAMILY MEDICINE | Facility: CLINIC | Age: 71
End: 2021-01-06

## 2021-01-06 ENCOUNTER — ALLIED HEALTH/NURSE VISIT (OUTPATIENT)
Dept: FAMILY MEDICINE | Facility: CLINIC | Age: 71
End: 2021-01-06
Payer: COMMERCIAL

## 2021-01-06 VITALS
RESPIRATION RATE: 16 BRPM | HEART RATE: 58 BPM | OXYGEN SATURATION: 96 % | DIASTOLIC BLOOD PRESSURE: 68 MMHG | SYSTOLIC BLOOD PRESSURE: 128 MMHG

## 2021-01-06 DIAGNOSIS — Z01.30 BP CHECK: Primary | ICD-10-CM

## 2021-01-06 PROCEDURE — 99207 PR NO CHARGE NURSE ONLY: CPT

## 2021-01-06 NOTE — TELEPHONE ENCOUNTER
"Vital Signs 9/24/2020 11/16/2020 12/17/2020 12/17/2020 1/6/2021   Systolic 130 124 140 150 128   Diastolic 70 82 70 66 68   Pulse 54 76 39  58   Temperature  97.7 97.7     Respirations  20 20  16   Weight (LB)  166 lb 173 lb     Height  5' 7\" 5' 8\"     BMI (Calculated)  26 26.3     O2   96  96     Pt sat for 10 minutes, NO call is needed.Oniel Trimble CMA    "

## 2021-01-15 DIAGNOSIS — Z11.59 ENCOUNTER FOR SCREENING FOR OTHER VIRAL DISEASES: ICD-10-CM

## 2021-01-15 LAB
LABORATORY COMMENT REPORT: NORMAL
SARS-COV-2 RNA RESP QL NAA+PROBE: NEGATIVE
SARS-COV-2 RNA RESP QL NAA+PROBE: NORMAL
SPECIMEN SOURCE: NORMAL
SPECIMEN SOURCE: NORMAL

## 2021-01-15 PROCEDURE — U0003 INFECTIOUS AGENT DETECTION BY NUCLEIC ACID (DNA OR RNA); SEVERE ACUTE RESPIRATORY SYNDROME CORONAVIRUS 2 (SARS-COV-2) (CORONAVIRUS DISEASE [COVID-19]), AMPLIFIED PROBE TECHNIQUE, MAKING USE OF HIGH THROUGHPUT TECHNOLOGIES AS DESCRIBED BY CMS-2020-01-R: HCPCS | Performed by: INTERNAL MEDICINE

## 2021-01-15 PROCEDURE — U0005 INFEC AGEN DETEC AMPLI PROBE: HCPCS | Performed by: INTERNAL MEDICINE

## 2021-01-18 ENCOUNTER — HOSPITAL ENCOUNTER (OUTPATIENT)
Facility: CLINIC | Age: 71
Discharge: HOME | End: 2021-01-18
Attending: INTERNAL MEDICINE | Admitting: INTERNAL MEDICINE
Payer: COMMERCIAL

## 2021-01-18 VITALS
HEART RATE: 65 BPM | OXYGEN SATURATION: 95 % | RESPIRATION RATE: 16 BRPM | DIASTOLIC BLOOD PRESSURE: 70 MMHG | SYSTOLIC BLOOD PRESSURE: 125 MMHG | WEIGHT: 169 LBS | BODY MASS INDEX: 25.61 KG/M2 | HEIGHT: 68 IN | TEMPERATURE: 97.7 F

## 2021-01-18 LAB — COLONOSCOPY: NORMAL

## 2021-01-18 PROCEDURE — 45378 DIAGNOSTIC COLONOSCOPY: CPT | Performed by: INTERNAL MEDICINE

## 2021-01-18 PROCEDURE — G0105 COLORECTAL SCRN; HI RISK IND: HCPCS | Performed by: INTERNAL MEDICINE

## 2021-01-18 PROCEDURE — G0500 MOD SEDAT ENDO SERVICE >5YRS: HCPCS | Performed by: INTERNAL MEDICINE

## 2021-01-18 PROCEDURE — 250N000011 HC RX IP 250 OP 636: Performed by: INTERNAL MEDICINE

## 2021-01-18 RX ORDER — FENTANYL CITRATE 50 UG/ML
INJECTION, SOLUTION INTRAMUSCULAR; INTRAVENOUS PRN
Status: DISCONTINUED | OUTPATIENT
Start: 2021-01-18 | End: 2021-01-18 | Stop reason: HOSPADM

## 2021-01-18 RX ORDER — NALOXONE HYDROCHLORIDE 0.4 MG/ML
0.4 INJECTION, SOLUTION INTRAMUSCULAR; INTRAVENOUS; SUBCUTANEOUS
Status: DISCONTINUED | OUTPATIENT
Start: 2021-01-18 | End: 2021-01-18 | Stop reason: HOSPADM

## 2021-01-18 RX ORDER — ONDANSETRON 2 MG/ML
4 INJECTION INTRAMUSCULAR; INTRAVENOUS EVERY 6 HOURS PRN
Status: DISCONTINUED | OUTPATIENT
Start: 2021-01-18 | End: 2021-01-18 | Stop reason: HOSPADM

## 2021-01-18 RX ORDER — NALOXONE HYDROCHLORIDE 0.4 MG/ML
0.2 INJECTION, SOLUTION INTRAMUSCULAR; INTRAVENOUS; SUBCUTANEOUS
Status: DISCONTINUED | OUTPATIENT
Start: 2021-01-18 | End: 2021-01-18 | Stop reason: HOSPADM

## 2021-01-18 RX ORDER — LIDOCAINE 40 MG/G
CREAM TOPICAL
Status: DISCONTINUED | OUTPATIENT
Start: 2021-01-18 | End: 2021-01-18 | Stop reason: HOSPADM

## 2021-01-18 RX ORDER — ONDANSETRON 4 MG/1
4 TABLET, ORALLY DISINTEGRATING ORAL EVERY 6 HOURS PRN
Status: DISCONTINUED | OUTPATIENT
Start: 2021-01-18 | End: 2021-01-18 | Stop reason: HOSPADM

## 2021-01-18 RX ORDER — FLUMAZENIL 0.1 MG/ML
0.2 INJECTION, SOLUTION INTRAVENOUS
Status: DISCONTINUED | OUTPATIENT
Start: 2021-01-18 | End: 2021-01-18 | Stop reason: HOSPADM

## 2021-01-18 RX ORDER — ONDANSETRON 2 MG/ML
4 INJECTION INTRAMUSCULAR; INTRAVENOUS
Status: DISCONTINUED | OUTPATIENT
Start: 2021-01-18 | End: 2021-01-18 | Stop reason: HOSPADM

## 2021-01-18 RX ORDER — PROCHLORPERAZINE MALEATE 5 MG
5 TABLET ORAL EVERY 6 HOURS PRN
Status: DISCONTINUED | OUTPATIENT
Start: 2021-01-18 | End: 2021-01-18 | Stop reason: HOSPADM

## 2021-01-18 ASSESSMENT — MIFFLIN-ST. JEOR: SCORE: 1501.08

## 2021-01-18 NOTE — LETTER
December 22, 2020      Mj Meeks  [address]  [facility] [address]        Dear Mj,     Please be aware that coverage of these services is subject to the terms and limitations of your health insurance plan.  Call member services at your health plan with any benefit or coverage questions.    Thank you for choosing [facility]. You are scheduled for the following service(s):    Date:  1/18/21             Procedure:  COLONOSCOPY  Doctor:        Dr. Gonzalez   Arrival Time:  10:30  *Enter and check in at the Main Hospital Entrance*  Procedure Time:  11:00      Location:   [facility]        Endoscopy Department, First Floor         [address] [place], [place] [address]      [phone] or [phone] ([facility]) to reschedule      MIRALAX -GATORADE  PREP  Colonoscopy is the most accurate test to detect colon polyps and colon cancer; and the only test where polyps can be removed. During this procedure, a doctor examines the lining of your large intestine and rectum through a flexible tube.   Transportation  You must arrange for a ride for the day of your procedure with a responsible adult. A taxi , Uber, etc, is not an option unless you are accompanied by a responsible adult. If you fail to arrange transportation with a responsible adult, your procedure will be cancelled and rescheduled.    Purchase the  following supplies at your local pharmacy:  - 2 (two) bisacodyl tablets: each tablet contains 5 mg.  (Dulcolax  laxative NOT Dulcolax  stool softener)   - 1 (one) 8.3 oz bottle of Polyethylene Glycol (PEG) 3350 Powder   (MiraLAX , Smooth LAX , ClearLAX  or equivalent)  - 64 oz Gatorade    Regular Gatorade, Gatorade G2 , Powerade , Powerade Zero  or Pedialyte  is acceptable. Red colored flavors are not allowed; all other colors (yellow, green, orange, purple and blue) are okay. It is also okay to buy two 2.12 oz packets of powdered Gatorade that can  be mixed with water to a total volume of 64 oz of liquid.  - 1 (one) 10 oz bottle of Magnesium Citrate (Red colored flavors are not allowed)  It is also okay for you to use a 0.5 oz package of powdered magnesium citrate (17 g) mixed with 10 oz of water.      PREPARATION FOR COLONOSCOPY    7 days before:    Discontinue fiber supplements and medications containing iron. This includes Metamucil  and Fibercon ; and multivitamins with iron.    3 days before:    Begin a low-fiber diet. A low-fiber diet helps making the cleanout more effective.     Examples of a low-fiber diet include (but are not limited to): white bread, white rice, pasta, crackers, fish, chicken, eggs, ground beef, creamy peanut butter, cooked/steamed/boiled vegetables, canned fruit, bananas, melons, milk, plain yogurt cheese, salad dressing and other condiments.     The following are not allowed on a low-fiber diet: seeds, nuts, popcorn, bran, whole wheat, corn, quinoa, raw fruits and vegetables, berries and dried fruit, beans and lentils.    For additional details on low-fiber diet, please refer to the table on the last page.    2 days before:    Continue the low-fiber diet.     Drink at least 8 glasses of water throughout the day.     Stop eating solid foods at 11:45 pm.    1 day before:    In the morning: begin a clear liquid diet (liquids you can see through).     Examples of a clear liquid diet include: water, clear broth or bouillon, Gatorade, Pedialyte or Powerade, carbonated and non-carbonated soft drinks (Sprite , 7-Up , ginger ale), strained fruit juices without pulp (apple, white grape, white cranberry), Jell-O  and popsicles.     The following are not allowed on a clear liquid diet: red liquids, alcoholic beverages, dairy products (milk, creamer, and yogurt), protein shakes, creamy broths, juice with pulp and chewing tobacco.    At noon: take 2 (two) bisacodyl tablets     At 4 (and no later than 6pm): start drinking the Miralax-Gatorade  preparation (8.3 oz of Miralax mixed with 64 oz of Gatorade in a large pitcher). Drink 1(one) 8 oz glass every 15 minutes thereafter, until the mixture is gone.    COLON CLEANSING TIPS: drink adequate amounts of fluids before and after your colon cleansing to prevent dehydration. Stay near a toilet because you will have diarrhea. Even if you are sitting on the toilet, continue to drink the cleansing solution every 15 minutes. If you feel nauseous or vomit, rinse your mouth with water, take a 15 to 30-minute-break and then continue drinking the solution. You will be uncomfortable until the stool has flushed from your colon (in about 2 to 4 hours). You may feel chilled.    Day of your procedure  You may take all of your morning medications including blood pressure medications, blood thinners (if you have not been instructed to stop these by our office), methadone, anti-seizure medications with sips of water 3 hours prior to your procedure or earlier. Do not take insulin or vitamins prior to your procedure. Continue the clear liquid diet.       4 hours prior: drink 10 oz of magnesium citrate. It may be easier to drink it with a straw.    STOP consuming all liquids after that.     Do not take anything by mouth during this time.     Allow extra time to travel to your procedure as you may need to stop and use a restroom along the way.    You are ready for the procedure, if you followed all instructions and your stool is no longer formed, but clear or yellow liquid. If you are unsure whether your colon is clean, please call our office at [phone] before you leave for your appointment.    Bring the following to your procedure:  - Insurance Card/Photo ID.   - List of current medications including over-the-counter medications and supplements.   - Your rescue inhaler if you currently use one to control asthma.    Canceling or rescheduling your appointment:   If you must cancel or reschedule your appointment, please call  [phone] as soon as possible.      COLONOSCOPY PRE-PROCEDURE CHECKLIST    If you have diabetes, ask your regular doctor for diet and medication restrictions.  If you take an anticoagulant or anti-platelet medication (such as Coumadin , Lovenox , Pradaxa , Xarelto , Eliquis , etc.), please call your primary doctor for advice on holding this medication.  If you take aspirin you may continue to do so.  If you are or may be pregnant, please discuss the risks and benefits of this procedure with your doctor.        What happens during a colonoscopy?    Plan to spend up to two hours, starting at registration time, at the endoscopy center the day of your procedure. The colonoscopy takes an average of 15 to 30 minutes. Recovery time is about 30 minutes.      Before the exam:    You will change into a gown.    Your medical history and medication list will be reviewed with you, unless that has been done over the phone prior to the procedure.     A nurse will insert an intravenous (IV) line into your hand or arm.    The doctor will meet with you and will give you a consent form to sign.  During the exam:     Medicine will be given through the IV line to help you relax.     Your heart rate and oxygen levels will be monitored. If your blood pressure is low, you may be given fluids through the IV line.     The doctor will insert a flexible hollow tube, called a colonoscope, into your rectum. The scope will be advanced slowly through the large intestine (colon).    You may have a feeling of fullness or pressure.     If an abnormal tissue or a polyp is found, the doctor may remove it through the endoscope for closer examination, or biopsy. Tissue removal is painless    After the exam:           Any tissue samples removed during the exam will be sent to a lab for evaluation. It may take 5-7 working days for you to be notified of the results.     A nurse will provide you with complete discharge instructions before you leave the  endoscopy center. Be sure to ask the nurse for specific instructions if you take blood thinners such as Aspirin, Coumadin or Plavix.     The doctor will prepare a full report for you and for the physician who referred you for the procedure.     Your doctor will talk with you about the initial results of your exam.      Medication given during the exam will prohibit you from driving for the rest of the day.     Following the exam, you may resume your normal diet. Your first meal should be light, no greasy foods. Avoid alcohol until the next day.     You may resume your regular activities the day after the procedure.         LOW-FIBER DIET    Foods RECOMMENDED Foods to AVOID   Breads, Cereal, Rice and Pasta:   White bread, rolls, biscuits, croissant and mandi toast.   Waffles, [place] toast and pancakes.   White rice, noodles, pasta, macaroni and peeled cooked potatoes.   Plain crackers and saltines.   Cooked cereals: farina, cream of rice.   Cold cereals: Puffed Rice , Rice Krispies , Corn Flakes  and Special K    Breads, Cereal, Rice and Pasta:   Breads or rolls with nuts, seeds or fruit.   Whole wheat, pumpernickel, rye breads and cornbread.   Potatoes with skin, brown or wild rice, and kasha (buckwheat).     Vegetables:   Tender cooked and canned vegetables without seeds: carrots, asparagus tips, green or wax beans, pumpkin, spinach, lima beans. Vegetables:   Raw or steamed vegetables.   Vegetables with seeds.   Sauerkraut.   Winter squash, peas, broccoli, Brussel sprouts, cabbage, onions, cauliflower, baked beans, peas and corn.   Fruits:   Strained fruit juice.   Canned fruit, except pineapple.   Ripe bananas and melon. Fruits:   Prunes and prune juice.   Raw fruits.   Dried fruits: figs, dates and raisins.   Milk/Dairy:   Milk: plain or flavored.   Yogurt, custard and ice cream.   Cheese and cottage cheese Milk/Dairy:     Meat and other proteins:   ground, well-cooked tender beef, lamb, ham, veal, pork, fish,  poultry and organ meats.   Eggs.   Peanut butter without nuts. Meat and other proteins:   Tough, fibrous meats with gristle.   Dry beans, peas and lentils.   Peanut butter with nuts.   Tofu.   Fats, Snack, Sweets, Condiments and Beverages:   Margarine, butter, oils, mayonnaise, sour cream and salad dressing, plain gravy.   Sugar, hard candy, clear jelly, honey and syrup.   Spices, cooked herbs, bouillon, broth and soups made with allowed vegetable, ketchup and mustard.   Coffee, tea and carbonated drinks.   Plain cakes, cookies and pretzels.   Gelatin, plain puddings, custard, ice cream, sherbet and popsicles. Fats, Snack, Sweets, Condiments and Beverages:   Nuts, seeds and coconut.   Jam, marmalade and preserves.   Pickles, olives, relish and horseradish.   All desserts containing nuts, seeds, dried fruit and coconut; or made from whole grains or bran.   Candy made with nuts or seeds.   Popcorn.         DIRECTIONS TO THE ENDOSCOPY DEPARTMENT    From the north ([facility])  Take [address] South, exit on [address] [address]. Get into the left hand toy, turn left (east), go one-half mile to [address] and turn left. Go north to the second stoplight, take a right on [address] and follow it to the Main Hospital entrance.    From the south ([facility])  Take [address]N to the [address] split and exit on [address] [address]. On [address] [address], turn left (west) to [address]. Turn right (north) on [address]. Go north to the second stoplight, take a right on [address] and follow it to the Main Hospital entrance.    From the east via 35E ([facility])  Take [address] south to [address] [address] exit. Turn right on [address] [address]. Go west to [address]. Turn right (north) on [address]. Go to the second stoplight, take a right on [address] to the Main Hospital entrance.    From the east via [address] ([facility])  Take [address] to [address]. Turn left  (south) on [address] to [address], turn left (east) on [address] and follow it to the Main Hospital entrance.    From the west via [address] ([place])  Take [address] east to [address]. Turn right (south) on [address] to [address], turn left (east) on [address] and follow it to the Main Hospital entrance.

## 2021-01-18 NOTE — DISCHARGE INSTRUCTIONS
Understanding Diverticulosis and Diverticulitis     Pouches or diverticula usually occur in the lower part of the colon called the sigmoid.      Diverticulitis occurs when the pouches become inflamed.     The colon (large intestine) is the last part of the digestive tract. It absorbs water from stool and changes it from a liquid to a solid. In certain cases, small pouches called diverticula can form in the colon wall. This condition is called diverticulosis. The pouches can become infected. If this happens, it becomes a more serious problem called diverticulitis. These problems can be painful. But they can be managed.   Managing Your Condition  Diet changes or taking medications are often tried first. These may be enough to bring relief. If the case is bad, surgery may be done. You and your doctor can discuss the plan that is best for you.  If You Have Diverticulosis  Diet changes are often enough to control symptoms. The main changes are adding fiber (roughage) and drinking more water. Fiber absorbs water as it travels through your colon. This helps your stool stay soft and move smoothly. Water helps this process. If needed, you may be told to take over-the-counter stool softeners. To help relieve pain, antispasmodic medications may be prescribed.  If You Have Diverticulitis  Treatment depends on how bad your symptoms are.  For mild symptoms: You may be put on a liquid diet for a short time. You may also be prescribed antibiotics. If these two steps relieve your symptoms, you may then be prescribed a high-fiber diet. If you still have symptoms, your doctor will discuss further treatment options with you.  For severe symptoms: You may need to be admitted to the hospital. There, you can be given IV antibiotics and fluids. Once symptoms are under control, the above treatments may be tried. If these don t control your condition, your doctor may discuss the option of having surgery with you.  [facility] to Colon  Health  Help keep your colon healthy with a diet that includes plenty of high-fiber fruits, vegetables, and whole grains. Drink plenty of liquids like water and juice. Your doctor may also recommend avoiding seeds and nuts.          [id number] Erik Marcelino, [address] [place] [address], [place] [address]. All rights reserved. This information is not intended as a substitute for professional medical care. Always follow your healthcare professional's instructions.    HIGH FIBER DIET  Fiber is present in all fruits, vegetables, cereals and grains. Fiber passes through the body undigested. A high fiber diet helps food move through the intestinal tract. The added bulk is helpful in preventing constipation. In people with diverticulosis it serves to clean out the pouches along the colon wall while preventing new ones from forming. A high fiber diet also reduces the risk of colon cancer, decreases blood cholesterol and prevents high blood sugar in people with diabetes.    The foods listed below are high in fiber and should be included in your diet. If you are not used to high fiber foods, start with 1 or 2 foods from this list. Every 3-4 days add a new one to your diet until you are eating 4 high fiber foods per day. This should give you 20-35 Gm of fiber/day. It is also important to drink a lot of water when you are on this diet (6-8 glasses a day). Water causes the fiber to swell and increases the benefit.    FOODS HIGH IN DIETARY FIBER:  BREADS: Made with 100% whole wheat flour; valeriano, wheat or rye crackers; tortillas, bran muffins  CEREALS: Whole grain cereal with bran (Chex, Raisin Bran, Corn Bran), oatmeal, rolled oats, granola, wheat flakes, brown rice  NUTS: Any nuts  FRUITS: All fresh fruits along with edible skins, (bananas, citrus fruit, mangoes, pears, prunes, raisins, apples, pineapple, apricot, melon, jams and marmalades), fruit juices (especially prune juice)  VEGETABLES: All types, preferably raw or lightly  cooked: especially, celery, eggplant, potatoes, spinach, broccoli, brussel sprouts, winter squash, carrots, cauliflower, soybeans, lentils, fresh and dried beans of all kinds  OTHER: Popcorn, any spices      [id number] Erik Marcelino, [address] [place] [address], [place] [address]. All rights reserved. This information is not intended as a substitute for professional medical care. Always follow your healthcare professional's instructions.

## 2021-01-18 NOTE — PRE-PROCEDURE
Pre-Endoscopy History and Physical     Mj Meeks MRN# [id number]   YOB: 1950 Age: 70 year old     Date of Procedure: 2021  Primary care provider: Reinier Santiago  Type of Endoscopy: colonoscopy  Reason for Procedure: f/u polyps  Type of Anesthesia Anticipated: Moderate (conscious) sedation    HPI:    Mj is a 70 year old male who will be undergoing the above procedure.      A history and physical has been performed. The patient's medications and allergies have been reviewed. The risks and benefits of the procedure and the sedation options and risks were discussed with the patient.  All questions were answered and informed consent was obtained.      No Known Allergies     Current Facility-Administered Medications   Medication     0.9% sodium chloride BOLUS     lidocaine (LMX4) kit     lidocaine 1 % 0.1-1 mL     ondansetron (ZOFRAN) injection 4 mg     sodium chloride (PF) 0.9% PF flush 3 mL     sodium chloride (PF) 0.9% PF flush 3 mL     sodium chloride (PF) 0.9% PF flush 3 mL       Patient Active Problem List   Diagnosis     CAD (coronary artery disease)     Hyperlipidemia LDL goal <100     RBBB (right bundle branch block)     History of tobacco abuse     Advanced directives, counseling/discussion     Uncomplicated alcohol dependence (H)        Past Medical History:   Diagnosis Date     CAD (coronary artery disease)      History of tobacco abuse      Hyperlipidemia LDL goal <100 2015     RBBB (right bundle branch block)         Past Surgical History:   Procedure Laterality Date     ENT SURGERY      Tonsillectomy     HERNIA REPAIR         Social History     Tobacco Use     Smoking status: Former Smoker     Packs/day: 2.00     Years: 20.00     Pack years: 40.00     Quit date: 2012     Years since quittin.3     Smokeless tobacco: Never Used   Substance Use Topics     Alcohol use: Yes     Alcohol/week: 0.0 standard drinks     Comment: 2+ drinks daily       Family History  "  Problem Relation Age of Onset     Diabetes Mother      Cancer Mother      Cardiovascular Father      Heart Disease Father         multiple MI's     Cancer Sister         ovarian cancer     Coronary Artery Disease Early Onset Son         MI     Colon Cancer No family hx of             Medications:     Medications Prior to Admission   Medication Sig Dispense Refill Last Dose     Ascorbic Acid (VITAMIN C PO) Take 1,000 mg by mouth        aspirin 81 MG tablet Take 81 mg by mouth daily   1/18/2021 at Unknown time     Cholecalciferol (VITAMIN D3 PO) Take by mouth daily        Multiple Vitamins-Minerals (CENTRUM SILVER) per tablet Take 1 tablet by mouth daily        Omega-3 Fatty Acids (FISH OIL OMEGA-3 PO)         sildenafil (VIAGRA) 100 MG tablet Take 1 tablet (100 mg) by mouth daily as needed (erectile dysfunction) 30 min to 4 hrs before sex. Do not use with nitroglycerin, terazosin or doxazosin. 50 tablet 0      simvastatin (ZOCOR) 20 MG tablet Take 1 tablet (20 mg) by mouth At Bedtime 90 tablet 3 1/18/2021 at Unknown time       Scheduled Medications:    sodium chloride 0.9%  500 mL Intravenous Once     sodium chloride (PF)  3 mL Intracatheter Q8H       PRN:  lidocaine 4%, lidocaine (buffered or not buffered), ondansetron, sodium chloride (PF), sodium chloride (PF)    PHYSICAL EXAM:   Ht 1.727 m (5' 8\")   Wt 76.7 kg (169 lb)   BMI 25.70 kg/m   Estimated body mass index is 25.7 kg/m  as calculated from the following:    Height as of this encounter: 1.727 m (5' 8\").    Weight as of this encounter: 76.7 kg (169 lb).   RESP: lungs clear to auscultation - no rales, rhonchi or wheezes  CV: regular rates and rhythm    IMPRESSION   ASA Class 2 - Mild systemic disease      Signed Electronically by: Binh Gonzalez MD  January 18, 2021    .            "

## 2021-02-05 ENCOUNTER — APPOINTMENT (OUTPATIENT)
Dept: CT IMAGING | Facility: CLINIC | Age: 71
DRG: 871 | End: 2021-02-05
Attending: EMERGENCY MEDICINE
Payer: COMMERCIAL

## 2021-02-05 ENCOUNTER — HOSPITAL ENCOUNTER (INPATIENT)
Facility: CLINIC | Age: 71
LOS: 1 days | Discharge: HOME | DRG: 871 | End: 2021-02-06
Attending: EMERGENCY MEDICINE | Admitting: INTERNAL MEDICINE
Payer: COMMERCIAL

## 2021-02-05 DIAGNOSIS — J18.9 PNEUMONIA OF RIGHT LUNG DUE TO INFECTIOUS ORGANISM, UNSPECIFIED PART OF LUNG: ICD-10-CM

## 2021-02-05 DIAGNOSIS — J96.01 ACUTE RESPIRATORY FAILURE WITH HYPOXIA (H): ICD-10-CM

## 2021-02-05 DIAGNOSIS — J18.9 COMMUNITY ACQUIRED PNEUMONIA OF RIGHT LOWER LOBE OF LUNG: Primary | ICD-10-CM

## 2021-02-05 LAB
ALBUMIN SERPL-MCNC: 3.7 G/DL (ref 3.4–5)
ALP SERPL-CCNC: 82 U/L (ref 40–150)
ALT SERPL W P-5'-P-CCNC: 16 U/L (ref 0–70)
ANION GAP SERPL CALCULATED.3IONS-SCNC: 8 MMOL/L (ref 3–14)
AST SERPL W P-5'-P-CCNC: 18 U/L (ref 0–45)
BASOPHILS # BLD AUTO: 0 10E9/L (ref 0–0.2)
BASOPHILS NFR BLD AUTO: 0.2 %
BILIRUB DIRECT SERPL-MCNC: 0.3 MG/DL (ref 0–0.2)
BILIRUB SERPL-MCNC: 1.1 MG/DL (ref 0.2–1.3)
BUN SERPL-MCNC: 21 MG/DL (ref 7–30)
CALCIUM SERPL-MCNC: 7.7 MG/DL (ref 8.5–10.1)
CHLORIDE SERPL-SCNC: 108 MMOL/L (ref 94–109)
CO2 SERPL-SCNC: 22 MMOL/L (ref 20–32)
CREAT SERPL-MCNC: 0.86 MG/DL (ref 0.66–1.25)
CREAT SERPL-MCNC: 0.88 MG/DL (ref 0.66–1.25)
D DIMER PPP FEU-MCNC: 2.6 UG/ML FEU (ref 0–0.5)
DIFFERENTIAL METHOD BLD: ABNORMAL
EOSINOPHIL # BLD AUTO: 0 10E9/L (ref 0–0.7)
EOSINOPHIL NFR BLD AUTO: 0.1 %
ERYTHROCYTE [DISTWIDTH] IN BLOOD BY AUTOMATED COUNT: 13 % (ref 10–15)
FLUAV RNA RESP QL NAA+PROBE: NEGATIVE
FLUBV RNA RESP QL NAA+PROBE: NEGATIVE
GFR SERPL CREATININE-BSD FRML MDRD: 87 ML/MIN/{1.73_M2}
GFR SERPL CREATININE-BSD FRML MDRD: 87 ML/MIN/{1.73_M2}
GLUCOSE SERPL-MCNC: 112 MG/DL (ref 70–99)
HCT VFR BLD AUTO: 38.9 % (ref 40–53)
HGB BLD-MCNC: 13 G/DL (ref 13.3–17.7)
IMM GRANULOCYTES # BLD: 0 10E9/L (ref 0–0.4)
IMM GRANULOCYTES NFR BLD: 0.2 %
INTERPRETATION ECG - MUSE: NORMAL
LABORATORY COMMENT REPORT: NORMAL
LIPASE SERPL-CCNC: 192 U/L (ref 73–393)
LYMPHOCYTES # BLD AUTO: 0.5 10E9/L (ref 0.8–5.3)
LYMPHOCYTES NFR BLD AUTO: 4.2 %
MCH RBC QN AUTO: 31.4 PG (ref 26.5–33)
MCHC RBC AUTO-ENTMCNC: 33.4 G/DL (ref 31.5–36.5)
MCV RBC AUTO: 94 FL (ref 78–100)
MONOCYTES # BLD AUTO: 0.5 10E9/L (ref 0–1.3)
MONOCYTES NFR BLD AUTO: 4.2 %
NEUTROPHILS # BLD AUTO: 11.1 10E9/L (ref 1.6–8.3)
NEUTROPHILS NFR BLD AUTO: 91.1 %
NRBC # BLD AUTO: 0 10*3/UL
NRBC BLD AUTO-RTO: 0 /100
PLATELET # BLD AUTO: 188 10E9/L (ref 150–450)
POTASSIUM SERPL-SCNC: 3.9 MMOL/L (ref 3.4–5.3)
PROCALCITONIN SERPL-MCNC: 0.37 NG/ML
PROT SERPL-MCNC: 6.7 G/DL (ref 6.8–8.8)
RBC # BLD AUTO: 4.14 10E12/L (ref 4.4–5.9)
RSV RNA SPEC QL NAA+PROBE: NORMAL
SARS-COV-2 RNA RESP QL NAA+PROBE: NEGATIVE
SODIUM SERPL-SCNC: 138 MMOL/L (ref 133–144)
SPECIMEN SOURCE: NORMAL
TROPONIN I SERPL-MCNC: <0.015 UG/L (ref 0–0.04)
WBC # BLD AUTO: 12.2 10E9/L (ref 4–11)

## 2021-02-05 PROCEDURE — 80076 HEPATIC FUNCTION PANEL: CPT | Performed by: EMERGENCY MEDICINE

## 2021-02-05 PROCEDURE — 80048 BASIC METABOLIC PNL TOTAL CA: CPT | Performed by: EMERGENCY MEDICINE

## 2021-02-05 PROCEDURE — 82565 ASSAY OF CREATININE: CPT | Performed by: INTERNAL MEDICINE

## 2021-02-05 PROCEDURE — 250N000009 HC RX 250: Performed by: EMERGENCY MEDICINE

## 2021-02-05 PROCEDURE — 258N000003 HC RX IP 258 OP 636: Performed by: INTERNAL MEDICINE

## 2021-02-05 PROCEDURE — 93005 ELECTROCARDIOGRAM TRACING: CPT

## 2021-02-05 PROCEDURE — 250N000011 HC RX IP 250 OP 636: Performed by: EMERGENCY MEDICINE

## 2021-02-05 PROCEDURE — 250N000011 HC RX IP 250 OP 636: Performed by: INTERNAL MEDICINE

## 2021-02-05 PROCEDURE — 83690 ASSAY OF LIPASE: CPT | Performed by: EMERGENCY MEDICINE

## 2021-02-05 PROCEDURE — 87636 SARSCOV2 & INF A&B AMP PRB: CPT | Performed by: EMERGENCY MEDICINE

## 2021-02-05 PROCEDURE — 71275 CT ANGIOGRAPHY CHEST: CPT

## 2021-02-05 PROCEDURE — 99285 EMERGENCY DEPT VISIT HI MDM: CPT | Mod: 25

## 2021-02-05 PROCEDURE — 85379 FIBRIN DEGRADATION QUANT: CPT | Performed by: EMERGENCY MEDICINE

## 2021-02-05 PROCEDURE — 84145 PROCALCITONIN (PCT): CPT | Performed by: INTERNAL MEDICINE

## 2021-02-05 PROCEDURE — 96365 THER/PROPH/DIAG IV INF INIT: CPT

## 2021-02-05 PROCEDURE — 250N000013 HC RX MED GY IP 250 OP 250 PS 637: Performed by: EMERGENCY MEDICINE

## 2021-02-05 PROCEDURE — 36415 COLL VENOUS BLD VENIPUNCTURE: CPT | Performed by: INTERNAL MEDICINE

## 2021-02-05 PROCEDURE — 120N000001 HC R&B MED SURG/OB

## 2021-02-05 PROCEDURE — 99223 1ST HOSP IP/OBS HIGH 75: CPT | Mod: AI | Performed by: INTERNAL MEDICINE

## 2021-02-05 PROCEDURE — 84484 ASSAY OF TROPONIN QUANT: CPT | Performed by: EMERGENCY MEDICINE

## 2021-02-05 PROCEDURE — C9803 HOPD COVID-19 SPEC COLLECT: HCPCS

## 2021-02-05 PROCEDURE — 96375 TX/PRO/DX INJ NEW DRUG ADDON: CPT

## 2021-02-05 PROCEDURE — 94640 AIRWAY INHALATION TREATMENT: CPT

## 2021-02-05 PROCEDURE — 85025 COMPLETE CBC W/AUTO DIFF WBC: CPT | Performed by: EMERGENCY MEDICINE

## 2021-02-05 RX ORDER — SIMVASTATIN 20 MG
20 TABLET ORAL DAILY
COMMUNITY
End: 2021-06-29

## 2021-02-05 RX ORDER — ACETAMINOPHEN 325 MG/1
650 TABLET ORAL EVERY 4 HOURS PRN
Status: DISCONTINUED | OUTPATIENT
Start: 2021-02-05 | End: 2021-02-06 | Stop reason: HOSPADM

## 2021-02-05 RX ORDER — IOPAMIDOL 755 MG/ML
500 INJECTION, SOLUTION INTRAVASCULAR ONCE
Status: COMPLETED | OUTPATIENT
Start: 2021-02-05 | End: 2021-02-05

## 2021-02-05 RX ORDER — ALBUTEROL SULFATE 90 UG/1
6 AEROSOL, METERED RESPIRATORY (INHALATION) ONCE
Status: COMPLETED | OUTPATIENT
Start: 2021-02-05 | End: 2021-02-05

## 2021-02-05 RX ORDER — CEFTRIAXONE 2 G/1
2 INJECTION, POWDER, FOR SOLUTION INTRAMUSCULAR; INTRAVENOUS EVERY 24 HOURS
Status: DISCONTINUED | OUTPATIENT
Start: 2021-02-06 | End: 2021-02-06

## 2021-02-05 RX ORDER — ASPIRIN 81 MG/1
81 TABLET ORAL DAILY
Status: DISCONTINUED | OUTPATIENT
Start: 2021-02-06 | End: 2021-02-06 | Stop reason: HOSPADM

## 2021-02-05 RX ORDER — PROCHLORPERAZINE 25 MG
12.5 SUPPOSITORY, RECTAL RECTAL EVERY 12 HOURS PRN
Status: DISCONTINUED | OUTPATIENT
Start: 2021-02-05 | End: 2021-02-06 | Stop reason: HOSPADM

## 2021-02-05 RX ORDER — BISACODYL 10 MG
10 SUPPOSITORY, RECTAL RECTAL DAILY PRN
Status: DISCONTINUED | OUTPATIENT
Start: 2021-02-05 | End: 2021-02-06 | Stop reason: HOSPADM

## 2021-02-05 RX ORDER — CALCIUM CARBONATE 500(1250)
1 TABLET ORAL DAILY
COMMUNITY

## 2021-02-05 RX ORDER — CEFTRIAXONE 2 G/1
2 INJECTION, POWDER, FOR SOLUTION INTRAMUSCULAR; INTRAVENOUS ONCE
Status: COMPLETED | OUTPATIENT
Start: 2021-02-05 | End: 2021-02-05

## 2021-02-05 RX ORDER — AZITHROMYCIN 250 MG/1
250 TABLET, FILM COATED ORAL DAILY
Status: DISCONTINUED | OUTPATIENT
Start: 2021-02-06 | End: 2021-02-06 | Stop reason: HOSPADM

## 2021-02-05 RX ORDER — AMOXICILLIN 250 MG
2 CAPSULE ORAL 2 TIMES DAILY PRN
Status: DISCONTINUED | OUTPATIENT
Start: 2021-02-05 | End: 2021-02-06 | Stop reason: HOSPADM

## 2021-02-05 RX ORDER — PROCHLORPERAZINE MALEATE 5 MG
5 TABLET ORAL EVERY 6 HOURS PRN
Status: DISCONTINUED | OUTPATIENT
Start: 2021-02-05 | End: 2021-02-06 | Stop reason: HOSPADM

## 2021-02-05 RX ORDER — LIDOCAINE 40 MG/G
CREAM TOPICAL
Status: DISCONTINUED | OUTPATIENT
Start: 2021-02-05 | End: 2021-02-06 | Stop reason: HOSPADM

## 2021-02-05 RX ORDER — ONDANSETRON 4 MG/1
4 TABLET, ORALLY DISINTEGRATING ORAL EVERY 6 HOURS PRN
Status: DISCONTINUED | OUTPATIENT
Start: 2021-02-05 | End: 2021-02-06 | Stop reason: HOSPADM

## 2021-02-05 RX ORDER — AZITHROMYCIN 250 MG/1
500 TABLET, FILM COATED ORAL ONCE
Status: COMPLETED | OUTPATIENT
Start: 2021-02-05 | End: 2021-02-05

## 2021-02-05 RX ORDER — DEXAMETHASONE SODIUM PHOSPHATE 10 MG/ML
6 INJECTION, SOLUTION INTRAMUSCULAR; INTRAVENOUS ONCE
Status: COMPLETED | OUTPATIENT
Start: 2021-02-05 | End: 2021-02-05

## 2021-02-05 RX ORDER — ONDANSETRON 2 MG/ML
4 INJECTION INTRAMUSCULAR; INTRAVENOUS EVERY 6 HOURS PRN
Status: DISCONTINUED | OUTPATIENT
Start: 2021-02-05 | End: 2021-02-06 | Stop reason: HOSPADM

## 2021-02-05 RX ORDER — SIMVASTATIN 20 MG
20 TABLET ORAL DAILY
Status: DISCONTINUED | OUTPATIENT
Start: 2021-02-06 | End: 2021-02-06 | Stop reason: HOSPADM

## 2021-02-05 RX ORDER — AMOXICILLIN 250 MG
1 CAPSULE ORAL 2 TIMES DAILY PRN
Status: DISCONTINUED | OUTPATIENT
Start: 2021-02-05 | End: 2021-02-06 | Stop reason: HOSPADM

## 2021-02-05 RX ORDER — SODIUM CHLORIDE, SODIUM LACTATE, POTASSIUM CHLORIDE, CALCIUM CHLORIDE 600; 310; 30; 20 MG/100ML; MG/100ML; MG/100ML; MG/100ML
INJECTION, SOLUTION INTRAVENOUS CONTINUOUS
Status: ACTIVE | OUTPATIENT
Start: 2021-02-05 | End: 2021-02-05

## 2021-02-05 RX ORDER — CALCIUM CARBONATE 500 MG/1
1000 TABLET, CHEWABLE ORAL 4 TIMES DAILY PRN
Status: DISCONTINUED | OUTPATIENT
Start: 2021-02-05 | End: 2021-02-06 | Stop reason: HOSPADM

## 2021-02-05 RX ADMIN — SODIUM CHLORIDE 91 ML: 9 INJECTION, SOLUTION INTRAVENOUS at 09:24

## 2021-02-05 RX ADMIN — ENOXAPARIN SODIUM 40 MG: 40 INJECTION SUBCUTANEOUS at 13:54

## 2021-02-05 RX ADMIN — DEXAMETHASONE SODIUM PHOSPHATE 6 MG: 10 INJECTION, SOLUTION INTRAMUSCULAR; INTRAVENOUS at 09:42

## 2021-02-05 RX ADMIN — ALBUTEROL SULFATE 6 PUFF: 90 AEROSOL, METERED RESPIRATORY (INHALATION) at 09:44

## 2021-02-05 RX ADMIN — CALCIUM GLUCONATE 1 G: 98 INJECTION, SOLUTION INTRAVENOUS at 13:47

## 2021-02-05 RX ADMIN — SODIUM CHLORIDE, POTASSIUM CHLORIDE, SODIUM LACTATE AND CALCIUM CHLORIDE: 600; 310; 30; 20 INJECTION, SOLUTION INTRAVENOUS at 15:04

## 2021-02-05 RX ADMIN — CEFTRIAXONE 2 G: 2 INJECTION, POWDER, FOR SOLUTION INTRAMUSCULAR; INTRAVENOUS at 10:34

## 2021-02-05 RX ADMIN — AZITHROMYCIN MONOHYDRATE 500 MG: 250 TABLET ORAL at 10:34

## 2021-02-05 RX ADMIN — IOPAMIDOL 85 ML: 755 INJECTION, SOLUTION INTRAVENOUS at 09:24

## 2021-02-05 ASSESSMENT — MIFFLIN-ST. JEOR: SCORE: 1541.9

## 2021-02-05 ASSESSMENT — ENCOUNTER SYMPTOMS
COUGH: 1
WEAKNESS: 1
SHORTNESS OF BREATH: 1
FEVER: 0
NAUSEA: 0
DIAPHORESIS: 1
ABDOMINAL PAIN: 1
CHILLS: 1
VOMITING: 0
SORE THROAT: 0

## 2021-02-05 ASSESSMENT — ACTIVITIES OF DAILY LIVING (ADL)
ADLS_ACUITY_SCORE: 15
ADLS_ACUITY_SCORE: 15

## 2021-02-05 NOTE — ED NOTES
[facility]  ED Nurse Handoff Report    Mj Meeks is a 70 year old male   ED Chief complaint: Shortness of Breath  . ED Diagnosis:   Final diagnoses:   Pneumonia of right lung due to infectious organism, unspecified part of lung   Acute respiratory failure with hypoxia (H)     Allergies: No Known Allergies    Code Status: Full Code  Activity level - Baseline/Home:  Independent. Activity Level - Current:   Independent. Lift room needed: No. Bariatric: No   Needed: No   Isolation: No. Infection: Not Applicable.     Vital Signs:   Vitals:    02/05/21 1010 02/05/21 1015 02/05/21 1030 02/05/21 1045   BP:  127/79 109/64 (!) 141/94   Pulse: 100 96 97 85   Resp: 15 14 25 16   Temp:       TempSrc:       SpO2: 92% 95% 95% 97%   Weight:           Cardiac Rhythm:  ,      Pain level:    Patient confused: No. Patient Falls Risk: Yes.   Elimination Status: Has voided   Patient Report - Initial Complaint:   ED Triage Notes Filed A&O x4, ABCs intact. Pt presents with SOB that he has since this morning. Pt reports having chills during the night. Pt was around people with COVID within the past 14 days.       Focused Assessment:   Gastrointestinal Gastrointestinal - Gastrointestinal WDL: .WDL except; GI symptoms  GI Signs/Symptoms: abdominal discomfort (RUQ) MN      Respiratory Respiratory - Respiratory WDL: .WDL except; rhythm/pattern  Rhythm/Pattern, Respiratory: shortness of breath         Tests Performed: labs, imaging Abnormal Results:   Labs Ordered and Resulted from Time of ED Arrival Up to the Time of Departure from the ED   CBC WITH PLATELETS DIFFERENTIAL - Abnormal; Notable for the following components:       Result Value    WBC 12.2 (*)     RBC Count 4.14 (*)     Hemoglobin 13.0 (*)     Hematocrit 38.9 (*)     Absolute Neutrophil 11.1 (*)     Absolute Lymphocytes 0.5 (*)     All other components within normal limits   BASIC METABOLIC PANEL - Abnormal; Notable for the following components:     Glucose 112 (*)     Calcium 7.7 (*)     All other components within normal limits   HEPATIC PANEL - Abnormal; Notable for the following components:    Bilirubin Direct 0.3 (*)     Protein Total 6.7 (*)     All other components within normal limits   D DIMER QUANTITATIVE - Abnormal; Notable for the following components:    D Dimer 2.6 (*)     All other components within normal limits   INFLUENZA A/B & SARS-COV2 PCR MULTIPLEX   LIPASE   TROPONIN I     CT Chest (PE) Abdomen Pelvis w Contrast   Final Result   IMPRESSION:   1.  No pulmonary embolism.   2.  Extensive right lung airspace opacities compatible with   pneumonitis. Findings are not typical of COVID-19 infection; however,   it is not entirely excluded.   3.  Tiny hiatal hernia.      OSITO WOLFE MD        .   Treatments provided: see MAR  Family Comments: none at bedside  OBS brochure/video discussed/provided to patient:  N/A  ED Medications:   Medications   albuterol (PROAIR HFA/PROVENTIL HFA/VENTOLIN HFA) 108 (90 Base) MCG/ACT inhaler 6 puff (6 puffs Inhalation Given 2/5/21 0944)   dexamethasone PF (DECADRON) injection 6 mg (6 mg Intravenous Given 2/5/21 0942)   CT Scan Flush (91 mLs Intravenous Given 2/5/21 0924)   iopamidol (ISOVUE-370) solution 500 mL (85 mLs Intravenous Given 2/5/21 0924)   cefTRIAXone (ROCEPHIN) 2 g vial to attach to  ml bag for ADULTS or NS 50 ml bag for PEDS (0 g Intravenous Stopped 2/5/21 1110)   azithromycin (ZITHROMAX) tablet 500 mg (500 mg Oral Given 2/5/21 1034)     Drips infusing:  No  For the majority of the shift, the patient's behavior Green. Interventions performed were N/A.    Sepsis treatment initiated: No     Patient tested for COVID 19 prior to admission: YES    ED Nurse Name/Phone Number: Patria Krishna RN,   11:10 AM    RECEIVING UNIT ED HANDOFF REVIEW    Above ED Nurse Handoff Report was reviewed: Yes  Reviewed by: Kimberly Leary RN on February 5, 2021 at 11:46 AM

## 2021-02-05 NOTE — PHARMACY-ADMISSION MEDICATION HISTORY
Admission medication history interview status for this patient is complete. See [facility] admission navigator for allergy information, prior to admission medications and immunization status.     Medication history interview done via telephone during Covid-19 pandemic, indicate source(s): Patient  Medication history resources (including written lists, pill bottles, clinic record):None      Changes made to PTA medication list:  Added: calcium  Deleted: none  Changed: none    Actions taken by pharmacist (provider contacted, etc):None     Additional medication history information:None    Medication reconciliation/reorder completed by provider prior to medication history?  N   (Y/N)         Prior to Admission medications    Medication Sig Last Dose Taking? Auth Provider   Ascorbic Acid (VITAMIN C PO) Take 1,000 mg by mouth daily  2/5/2021 at am Yes Reported, Patient   aspirin 81 MG tablet Take 81 mg by mouth daily 2/5/2021 at am Yes Reported, Patient   calcium carbonate (OS-CHLOE) 500 MG tablet Take 1 tablet by mouth daily 2/5/2021 at am Yes Unknown, Entered By History   Cholecalciferol (VITAMIN D3 PO) Take 2,000 Units by mouth daily  2/5/2021 at am Yes Reported, Patient   Multiple Vitamins-Minerals (CENTRUM SILVER) per tablet Take 1 tablet by mouth daily 2/5/2021 at am Yes Reported, Patient   Omega-3 Fatty Acids (FISH OIL OMEGA-3 PO) Take 1 g by mouth daily  2/5/2021 at am Yes Reported, Patient   simvastatin (ZOCOR) 20 MG tablet Take 20 mg by mouth daily 2/5/2021 at am Yes Unknown, Entered By History   sildenafil (VIAGRA) 100 MG tablet Take 1 tablet (100 mg) by mouth daily as needed (erectile dysfunction) 30 min to 4 hrs before sex. Do not use with nitroglycerin, terazosin or doxazosin. More than a month at Unknown time  Ashok Little MD

## 2021-02-05 NOTE — ED TRIAGE NOTES
A&O x4, ABCs intact. Pt presents with SOB that he has since this morning. Pt reports having chills during the night. Pt was around people with COVID within the past 14 days.

## 2021-02-05 NOTE — ED PROVIDER NOTES
History     Chief Complaint:  Shortness of Breath       DOMINGA Meeks is a 70 year old male with a history of CAD and hyperlipidemia who presents for evaluation of shortness of breath and chills. The patient states that he had novel onset of weakness, shortness of breath, chills, and cold sweats last night. There was then development of right upper quadrant abdominal to inferior chest pain. He states that this pain has ameliorated prior to arrival. He denies any changes in his chronic cough. He further denies any nausea, vomiting, fevers, sore throat, or sick contacts he can appreciate.       Review of Systems   Constitutional: Positive for chills and diaphoresis. Negative for fever.   HENT: Negative for sore throat.    Respiratory: Positive for cough (chronic) and shortness of breath.    Cardiovascular: Positive for chest pain (not currently).   Gastrointestinal: Positive for abdominal pain (RUQ). Negative for nausea and vomiting.   Neurological: Positive for weakness.   All other systems reviewed and are negative.        Allergies:  No Known Allergies     Medications:   Asprin 81  Viagra  Zocor    Medical History:   CAD   Tobacco use   Hyperlipidemia    right  bundle branch block   Alcohol dependence    Surgical History   Tonsillectomy   Hernia repair    Family History:   Mother: Diabetes, cancer  Father:  MI    Social History:  Patient presents alone.  PCP: Reinier Santiago Ray     Physical Exam     Patient Vitals for the past 24 hrs:   BP Temp Temp src Pulse Resp SpO2 Weight   02/05/21 0759 -- 98.5  F (36.9  C) Oral -- 18 -- --   02/05/21 0758 136/79 -- -- 91 -- 98 % 79.4 kg (175 lb)          Physical Exam    HENT:  mmm, no rhinorrhea  Eyes: periorbital tissues and sclera normal   Neck: supple, no abnormal swelling  Lungs: Mild tachypnea and accessory muscle use, speaking in full sentences, lungs clear to auscultation.  CV: rrr, no m/r/g, ppi  Abd: soft, mild tenderness in the right upper quadrant worse with  inspiration but not true Austin sign, nondistended, no rebound/masses/guarding/hsm  Ext: no peripheral edema  Skin: warm, dry, well perfused, no rashes/bruising/lesions on exposed skin  Neuro: alert, MAEE, no gross motor or sensory deficits, gait stable  Psych: Normal mood, normal affect      Emergency Department Course     ECG:  ECG taken at 0805, ECG read at 1819  sinus rhythm with frequent premature ventricular contractions   Left axis deviation   right  bundle branch block   OVC   Abnormal ECG  Rate 90 bpm. GA interval 154 ms. QRS duration 136 ms. QT/QTc 410/501 ms. P-R-T axes 57 -43 52.     Imaging:    CT Chest (PE) Abdomen Pelvis w Contrast  1.  No pulmonary embolism.  2.  Extensive right lung airspace opacities compatible with  pneumonitis. Findings are not typical of COVID-19 infection; however,  it is not entirely excluded.  3.  Tiny hiatal hernia.    OSIOT WOLFE MD    Reading per radiology     Laboratory:    CBC: WBC 12.2 (H), HGB 13.0 (L) ,    BMP: Glucose 112 Calcium 7.7 (L) o/w WNL (Creatinine 0.88)  Hepatic Panel: Bilirubin 0.3 (H) Protein total 6.7 (L) o/w WNL     D dimer: 2.6 (H)   Troponin: (Collected 0822) <0.015   Lipase: 192    Symptomatic Influenza A/B & SARS-CoV2 (COVID-19) Virus PCR Multiplex:  Covid neg  Influenza A: neg Influenza B neg    Emergency Department Course:    Reviewed:  I reviewed nursing notes, vitals and past medical history    Assessments:  0825 I assessed the patient as above.   1050 Patient rechecked and updated.      Consults:   1105 I spoke with Dr. George of the hospitalist service regarding patient's presentation, findings, and plan of care.      Interventions:  0942 Decadron 6 mg IV  0944 Albuterol Proventil 6 puffs  1034 Zithromax 500 mg PO  1034 Rocephin 2 g IV     Disposition:  Admitted to general medicine.    Impression & Plan     Medical Decision Making:  Mj Meeks is a 70 year old male who presents with shortness of breath right-sided chest  pain found to have right-sided pneumonitis versus pneumonia, Covid negative.  Hypoxic down into the high 80s.  Not known to need supplemental oxygen.  Will admit for supplemental oxygen and community acquired antibiotic coverage.  He was given a single dose of Decadron no suspicion for Covid was initially quite high as well as albuterol.  He has no known immunocompromising conditions exotic pets or reasons for resistant or atypical bacteria.    Covid-19  Mj Meeks was evaluated during a global COVID-19 pandemic, which necessitated consideration that the patient might be at risk for infection with the SARS-CoV-2 virus that causes COVID-19.   Applicable protocols for evaluation were followed during the patient's care.   COVID-19 was considered as part of the patient's evaluation. The plan for testing is:  a test was obtained during this visit.    Diagnosis:     ICD-10-CM    1. Pneumonia of right lung due to infectious organism, unspecified part of lung  J18.9    2. Acute respiratory failure with hypoxia (H)  J96.01         Disposition:  Admitted to Dr. George.    Scribe Disclosure:  Jonathan DIAZ, am serving as a scribe at 8:10 AM on 2/5/2021 to document services personally performed by Chang Ching MD based on my observations and the provider's statements to me.     Dev [email or web address]  February 5, 2021      Chang Ching MD  02/05/21 1522

## 2021-02-05 NOTE — H&P
[facility]  History and Physical   Hospitalist Service    Jaswinder George MD    Mj Meeks MRN# [id number]   YOB: 1950 Age: 70 year old      Date of Admission:  2/5/2021           Assessment and Plan:   Vandana Meeks is a 70-year-old male with history of coronary artery disease, dyslipidemia, left bundle branch block, previous tobacco use, and erectile dysfunction.  He presented to the emergency department this morning for evaluation of shortness of breath, chills, and right lower chest/right upper abdominal discomfort.  These symptoms started last night.  He read on a website that many of these symptoms are symptoms of COVID-19 so he was concerned.  He reports exposure to a couple of people at the [employer] that said they had recovered from COVID-19.  He denies any known infectious COVID-19 exposures.  Emergency department evaluation showed heart rate 91, oxygen saturations 88 or 89% on room air, otherwise unremarkable vital signs, white blood cell 12.2, D-dimer 2.6, and otherwise unremarkable labs.  Of note, testing for COVID-19, influenza, and RSV was all negative.  EKG was obtained and showed no signs of coronary ischemia.  CT of chest/abdomen, pelvis with PE protocol showed extensive right lung airspace disease consistent with pneumonitis.  This was not thought to be particularly suggestive of COVID-19.  There is no PE or other worrisome finding.  Patient was given Rocephin and Zithromax for community-acquired pneumonia.  I was asked to admit him to the hospital for further care of pneumonia and hypoxic respiratory failure.    Problem list:    1.  Sepsis (heart rate 91, leukocytosis, and chills) and acute hypoxic respiratory failure due to right-sided community-acquired pneumonia.  COVID-19 seems unlikely with negative Covid test and non-characteristic features on CT scan.  Admit as inpatient.  Treat with Rocephin and Zithromax.  Use supplemental oxygen as needed.  Check  sputum culture.  Check procalcitonin.  Hydrate with lactated Ringer's (1 L).  Basic metabolic panel and CBC in the morning.  Antitussive medication will be available for use as needed.    2.  Hypocalcemia.  I have ordered calcium gluconate x1 ampoule.  Repeat basic metabolic panel in the morning.    3.  Stable medical conditions include coronary artery disease, dyslipidemia, and coronary artery disease.  Resume prior to admission aspirin and Zocor.    Full code  Lovenox for DVT prophylaxis  This position: Admit as inpatient           Code Status:   Full Code         Primary Care Physician:   Reinier Santiago [phone]         Chief Complaint:   's of breath, chills, right lower chest/right upper abdominal pain    History is obtained from Dr. Humza Ross, and the medical record         History of Present Illness:   Vandana Meeks is a 70-year-old male with history of coronary artery disease, dyslipidemia, left bundle branch block, previous tobacco use, and erectile dysfunction.  He presented to the emergency department this morning for evaluation of shortness of breath, chills, and right lower chest/right upper abdominal discomfort.  These symptoms started last night.  He read on a website that many of these symptoms are symptoms of COVID-19 so he was concerned.  He reports exposure to a couple of people at the [employer] that said they had recovered from COVID-19.  He denies any known infectious COVID-19 exposures.  Emergency department evaluation showed heart rate 91, oxygen saturations 88 or 89% on room air, otherwise unremarkable vital signs, white blood cell 12.2, D-dimer 2.6, and otherwise unremarkable labs.  Of note, testing for COVID-19, influenza, and RSV was all negative.  EKG was obtained and showed no signs of coronary ischemia.  CT of chest/abdomen, pelvis with PE protocol showed extensive right lung airspace disease consistent with pneumonitis.  This was not thought to be particularly suggestive of  COVID-19.  There is no PE or other worrisome finding.  Patient was given Rocephin and Zithromax for community-acquired pneumonia.  I was asked to admit him to the hospital for further care of pneumonia and hypoxic respiratory failure.           Past Medical History:     Patient Active Problem List   Diagnosis     CAD (coronary artery disease)     Hyperlipidemia LDL goal <100     RBBB (right bundle branch block)     History of tobacco abuse     Advanced directives, counseling/discussion     Uncomplicated alcohol dependence (H)     Community acquired pneumonia of right lower lobe of lung     Acute respiratory failure with hypoxia (H)     Pneumonia of right lung due to infectious organism, unspecified part of lung      Past Medical History:   Diagnosis Date     CAD (coronary artery disease)      History of tobacco abuse      Hyperlipidemia LDL goal <100 4/27/2015     RBBB (right bundle branch block)              Past Surgical History:     Past Surgical History:   Procedure Laterality Date     COLONOSCOPY Left 1/18/2021    Procedure: COLONOSCOPY ([facility]);  Surgeon: Binh Gonzalez MD;  Location:  GI     ENT SURGERY      Tonsillectomy     HERNIA REPAIR  1985            Home Medications:     Prior to Admission medications    Medication Sig Last Dose Taking? Auth Provider   Ascorbic Acid (VITAMIN C PO) Take 1,000 mg by mouth daily  2/5/2021 at am Yes Reported, Patient   aspirin 81 MG tablet Take 81 mg by mouth daily 2/5/2021 at am Yes Reported, Patient   calcium carbonate (OS-CHLOE) 500 MG tablet Take 1 tablet by mouth daily 2/5/2021 at am Yes Unknown, Entered By History   Cholecalciferol (VITAMIN D3 PO) Take 2,000 Units by mouth daily  2/5/2021 at am Yes Reported, Patient   Multiple Vitamins-Minerals (CENTRUM SILVER) per tablet Take 1 tablet by mouth daily 2/5/2021 at am Yes Reported, Patient   Omega-3 Fatty Acids (FISH OIL OMEGA-3 PO) Take 1 g by mouth daily  2/5/2021 at am Yes Reported, Patient   simvastatin (ZOCOR) 20  "MG tablet Take 20 mg by mouth daily 2021 at am Yes Unknown, Entered By History   sildenafil (VIAGRA) 100 MG tablet Take 1 tablet (100 mg) by mouth daily as needed (erectile dysfunction) 30 min to 4 hrs before sex. Do not use with nitroglycerin, terazosin or doxazosin. More than a month at Unknown time  Ashok Little MD            Allergies:   No Known Allergies         Social History:     Social History     Tobacco Use     Smoking status: Former Smoker     Packs/day: 2.00     Years: 20.00     Pack years: 40.00     Quit date: 2012     Years since quittin.3     Smokeless tobacco: Never Used   Substance Use Topics     Alcohol use: Yes     Alcohol/week: 0.0 standard drinks     Comment: 2+ drinks daily             Family History:     Family History   Problem Relation Age of Onset     Diabetes Mother      Cancer Mother      Cardiovascular Father      Heart Disease Father         multiple MI's     Cancer Sister         ovarian cancer     Coronary Artery Disease Early Onset Son         MI     Colon Cancer No family hx of               Review of Systems:   The 10 point Review of Systems is negative other than as noted in the HPI.           Physical Exam:   Blood pressure (!) 155/65, pulse 85, temperature 97.8  F (36.6  C), temperature source Oral, resp. rate 16, height 1.727 m (5' 8\"), weight 80.7 kg (178 lb), SpO2 95 %.  178 lbs 0 oz      GENERAL: Pleasant and cooperative. No acute distress.  EYES: Pupils equal and round. No scleral erythema or icterus.  ENT: External ears are normal without deformity. Posterior oropharynx is without erythem, swelling, or exudate.  NECK: Supple. No masses or swelling. No tenderness. Thyroid is normal without mass or tenderness.  CHEST: Clear to auscultation. Normal breath sounds. No retractions.   CV: Regular rate and rhythm. No JVD. Pulses normal.  ABDOMEN: Bowel sounds present. No tenderness. No masses or hernia.  EXTREMETIES: No clubbing, cyanosis, or ischemia.  SKIN: " Warm and dry to touch. No wounds or rashes.  NEUROLOGIC: Strength and sensation are normal. Deep tendon reflexes are normal. Cranial nerves are normal.             Data:   All new lab and imaging data was reviewed.     Results for orders placed or performed during the hospital encounter of 02/05/21 (from the past 24 hour(s))   EKG 12 lead   Result Value Ref Range    Interpretation ECG Click View Image link to view waveform and result    CBC with platelets differential   Result Value Ref Range    WBC 12.2 (H) 4.0 - 11.0 10e9/L    RBC Count 4.14 (L) 4.4 - 5.9 10e12/L    Hemoglobin 13.0 (L) 13.3 - 17.7 g/dL    Hematocrit 38.9 (L) 40.0 - 53.0 %    MCV 94 78 - 100 fl    MCH 31.4 26.5 - 33.0 pg    MCHC 33.4 31.5 - 36.5 g/dL    RDW 13.0 10.0 - 15.0 %    Platelet Count 188 150 - 450 10e9/L    Diff Method Automated Method     % Neutrophils 91.1 %    % Lymphocytes 4.2 %    % Monocytes 4.2 %    % Eosinophils 0.1 %    % Basophils 0.2 %    % Immature Granulocytes 0.2 %    Nucleated RBCs 0 0 /100    Absolute Neutrophil 11.1 (H) 1.6 - 8.3 10e9/L    Absolute Lymphocytes 0.5 (L) 0.8 - 5.3 10e9/L    Absolute Monocytes 0.5 0.0 - 1.3 10e9/L    Absolute Eosinophils 0.0 0.0 - 0.7 10e9/L    Absolute Basophils 0.0 0.0 - 0.2 10e9/L    Abs Immature Granulocytes 0.0 0 - 0.4 10e9/L    Absolute Nucleated RBC 0.0    Basic metabolic panel   Result Value Ref Range    Sodium 138 133 - 144 mmol/L    Potassium 3.9 3.4 - 5.3 mmol/L    Chloride 108 94 - 109 mmol/L    Carbon Dioxide 22 20 - 32 mmol/L    Anion Gap 8 3 - 14 mmol/L    Glucose 112 (H) 70 - 99 mg/dL    Urea Nitrogen 21 7 - 30 mg/dL    Creatinine 0.88 0.66 - 1.25 mg/dL    GFR Estimate 87 >60 mL/min/[1.73_m2]    GFR Estimate If Black >90 >60 mL/min/[1.73_m2]    Calcium 7.7 (L) 8.5 - 10.1 mg/dL   Hepatic panel   Result Value Ref Range    Bilirubin Direct 0.3 (H) 0.0 - 0.2 mg/dL    Bilirubin Total 1.1 0.2 - 1.3 mg/dL    Albumin 3.7 3.4 - 5.0 g/dL    Protein Total 6.7 (L) 6.8 - 8.8 g/dL     Alkaline Phosphatase 82 40 - 150 U/L    ALT 16 0 - 70 U/L    AST 18 0 - 45 U/L   D dimer quantitative   Result Value Ref Range    D Dimer 2.6 (H) 0.0 - 0.50 ug/ml FEU   Lipase   Result Value Ref Range    Lipase 192 73 - 393 U/L   Troponin I   Result Value Ref Range    Troponin I ES <0.015 0.000 - 0.045 ug/L   Symptomatic Influenza A/B & SARS-CoV2 (COVID-19) Virus PCR Multiplex    Specimen: Nasopharyngeal   Result Value Ref Range    Flu A/B & SARS-COV-2 PCR Source Nasopharyngeal     SARS-CoV-2 PCR Result NEGATIVE     Influenza A PCR Negative NEG^Negative    Influenza B PCR Negative NEG^Negative    Respiratory Syncytial Virus PCR (Note)     Flu A/B & SARS-CoV-2 PCR Comment (Note)    CT Chest (PE) Abdomen Pelvis w Contrast    Narrative    CT CHEST PE ABDOMEN PELVIS W CONTRAST 2/5/2021 9:41 AM    CLINICAL HISTORY: R lower CP, SOB, also RUQ pain worse on inspiration  TECHNIQUE: CT angiogram chest and routine CT abdomen pelvis with IV  contrast. Arterial phase through the chest and venous phase through  the abdomen and pelvis. 2D and 3D MIP reconstructions were preformed  by the CT technologist. Dose reduction techniques were used.     CONTRAST: 85mL [id number]-370    COMPARISON: CT chest 5/13/2015    FINDINGS:  ANGIOGRAM CHEST: Pulmonary arteries are normal caliber and negative  for pulmonary emboli. Thoracic aorta is negative for dissection. No CT  evidence of right heart strain.     LUNGS AND PLEURA: Patent central veins. Mild apical predominant  centrilobular emphysema. Right lower lobe calcified granuloma.  Extensive right lung groundglass opacities are identified with minimal  associated interlobular septal thickening. Minimal left basilar  atelectasis. No pleural effusion. No pneumothorax.    MEDIASTINUM/AXILLAE: Borderline enlarged right hilar lymph node  measuring 1.0 cm is likely reactive. No intrathoracic lymphadenopathy  also. Severe coronary artery calcifications. Mild cardiomegaly. No  pericardial effusion. Tiny  hiatal hernia.    HEPATOBILIARY: Homogeneous enhancement. Posterior right hepatic  calcified granuloma. No focal hepatic mass. No biliary dilatation. No  calcified gallstones.    PANCREAS: Homogeneous enhancement. No focal pancreatic mass. No  peripancreatic inflammatory changes. No pancreatic ductal dilatation.    SPLEEN: Normal size.    ADRENAL GLANDS: No nodules.    KIDNEYS/BLADDER: Metric enhancement of the kidneys. Bilateral vascular  calcifications. No collecting system dilatation. No urinary bladder  wall thickening. Prostatic hypertrophy.    BOWEL: Unremarkable appearance of the infradiaphragmatic stomach,  duodenum, and small bowel. Normal caliber appendix. Unremarkable  colon.    LYMPH NODES: No abdominopelvic lymphadenopathy.    OTHER: No ascites.    MUSCULOSKELETAL: No aggressive osseous lesions.      Impression    IMPRESSION:  1.  No pulmonary embolism.  2.  Extensive right lung airspace opacities compatible with  pneumonitis. Findings are not typical of COVID-19 infection; however,  it is not entirely excluded.  3.  Tiny hiatal hernia.    OSITO WOLFE MD   Creatinine   Result Value Ref Range    Creatinine 0.86 0.66 - 1.25 mg/dL    GFR Estimate 87 >60 mL/min/[1.73_m2]    GFR Estimate If Black >90 >60 mL/min/[1.73_m2]

## 2021-02-06 VITALS
RESPIRATION RATE: 16 BRPM | TEMPERATURE: 97.9 F | BODY MASS INDEX: 26.98 KG/M2 | HEIGHT: 68 IN | WEIGHT: 178 LBS | HEART RATE: 71 BPM | SYSTOLIC BLOOD PRESSURE: 103 MMHG | OXYGEN SATURATION: 98 % | DIASTOLIC BLOOD PRESSURE: 52 MMHG

## 2021-02-06 LAB
ANION GAP SERPL CALCULATED.3IONS-SCNC: 4 MMOL/L (ref 3–14)
BUN SERPL-MCNC: 15 MG/DL (ref 7–30)
CALCIUM SERPL-MCNC: 8.8 MG/DL (ref 8.5–10.1)
CHLORIDE SERPL-SCNC: 109 MMOL/L (ref 94–109)
CO2 SERPL-SCNC: 26 MMOL/L (ref 20–32)
CREAT SERPL-MCNC: 0.79 MG/DL (ref 0.66–1.25)
ERYTHROCYTE [DISTWIDTH] IN BLOOD BY AUTOMATED COUNT: 13 % (ref 10–15)
GFR SERPL CREATININE-BSD FRML MDRD: >90 ML/MIN/{1.73_M2}
GLUCOSE SERPL-MCNC: 119 MG/DL (ref 70–99)
HCT VFR BLD AUTO: 41.2 % (ref 40–53)
HGB BLD-MCNC: 13.7 G/DL (ref 13.3–17.7)
MCH RBC QN AUTO: 31.6 PG (ref 26.5–33)
MCHC RBC AUTO-ENTMCNC: 33.3 G/DL (ref 31.5–36.5)
MCV RBC AUTO: 95 FL (ref 78–100)
PLATELET # BLD AUTO: 206 10E9/L (ref 150–450)
POTASSIUM SERPL-SCNC: 4.1 MMOL/L (ref 3.4–5.3)
RBC # BLD AUTO: 4.34 10E12/L (ref 4.4–5.9)
SODIUM SERPL-SCNC: 139 MMOL/L (ref 133–144)
WBC # BLD AUTO: 12.3 10E9/L (ref 4–11)

## 2021-02-06 PROCEDURE — 80048 BASIC METABOLIC PNL TOTAL CA: CPT | Performed by: INTERNAL MEDICINE

## 2021-02-06 PROCEDURE — 85027 COMPLETE CBC AUTOMATED: CPT | Performed by: INTERNAL MEDICINE

## 2021-02-06 PROCEDURE — 36415 COLL VENOUS BLD VENIPUNCTURE: CPT | Performed by: INTERNAL MEDICINE

## 2021-02-06 PROCEDURE — 250N000013 HC RX MED GY IP 250 OP 250 PS 637: Performed by: INTERNAL MEDICINE

## 2021-02-06 PROCEDURE — 99238 HOSP IP/OBS DSCHRG MGMT 30/<: CPT | Performed by: INTERNAL MEDICINE

## 2021-02-06 RX ORDER — CEFUROXIME AXETIL 500 MG/1
500 TABLET ORAL EVERY 12 HOURS
Qty: 10 TABLET | Refills: 0 | Status: SHIPPED | OUTPATIENT
Start: 2021-02-06 | End: 2021-02-11

## 2021-02-06 RX ORDER — AZITHROMYCIN 250 MG/1
250 TABLET, FILM COATED ORAL DAILY
Qty: 3 TABLET | Refills: 0 | Status: SHIPPED | OUTPATIENT
Start: 2021-02-07 | End: 2021-02-10

## 2021-02-06 RX ORDER — CEFUROXIME AXETIL 250 MG/1
500 TABLET ORAL EVERY 12 HOURS SCHEDULED
Status: DISCONTINUED | OUTPATIENT
Start: 2021-02-06 | End: 2021-02-06 | Stop reason: HOSPADM

## 2021-02-06 RX ADMIN — CEFUROXIME AXETIL 500 MG: 250 TABLET ORAL at 10:10

## 2021-02-06 RX ADMIN — ASPIRIN 81 MG: 81 TABLET ORAL at 09:10

## 2021-02-06 RX ADMIN — AZITHROMYCIN MONOHYDRATE 250 MG: 250 TABLET ORAL at 09:10

## 2021-02-06 RX ADMIN — SIMVASTATIN 20 MG: 20 TABLET, FILM COATED ORAL at 09:10

## 2021-02-06 ASSESSMENT — ACTIVITIES OF DAILY LIVING (ADL)
ADLS_ACUITY_SCORE: 15

## 2021-02-06 NOTE — PLAN OF CARE
Pertinent assessments: A&Ox4. On 2L of O2 via NC. LS dim. BS normoactive. Cardiac WNL.  Major Shift Events : Pt. accidentally pulled IV out with movement. Completed LR infusion.   Treatment Plan: Lovenox, azithromycin, IV Rocephin.

## 2021-02-06 NOTE — DISCHARGE SUMMARY
"Discharge Summary    Mj Meeks MRN# [id number]   YOB: 1950 Age: 70 year old     Date of Admission:  2/5/2021  Date of Discharge:  2/6/2021  Admitting Physician:  Jaswinder George MD  Discharge Physician:  Jaswinder George MD  Discharging Service:  Hospitalist       Primary Provider: Reinier Santiago          Discharge Diagnosis:   1.  Sepsis (heart rate 91, leukocytosis, and chills) and acute hypoxic respiratory failure due to right-sided community-acquired pneumonia.      2.  Testing for COVID-19 was negative.     3.  Hypocalcemia, replaced.     4.  Stable medical conditions include coronary artery disease, dyslipidemia, and coronary artery disease.                 Discharge Disposition:   Discharged to home           Allergies:   No Known Allergies             Condition on Discharge:   Discharge condition: Stable   Discharge vitals: Blood pressure 103/52, pulse 71, temperature 97.9  F (36.6  C), temperature source Oral, resp. rate 16, height 1.727 m (5' 8\"), weight 80.7 kg (178 lb), SpO2 98 %.   Code status on discharge: Full Code   Physical exam on day of discharge:   GENERAL:  Comfortable. Cooperative.  PSYCH: pleasant, oriented, No acute distress.  EYES: PERRLA, Normal conjunctiva.  HEART:  Regular rate and rhythm. No JVD. Pulses normal. No edema.  LUNGS:  Clear to auscultation, normal Respiratory effort.  ABDOMEN:  Soft, no hepatosplenomegaly, normal bowel sounds.  EXTREMETIES: No clubbing, cyanosis or ischemia  SKIN:  Dry to touch, No rash.         History of Present Illness and Hospital Course:     See detailed admission note for full details.  Vandana Meeks is a 70-year-old male with history of coronary artery disease, dyslipidemia, left bundle branch block, previous tobacco use, and erectile dysfunction.  He presented to the emergency department this morning for evaluation of shortness of breath, chills, and right lower chest/right upper abdominal discomfort.  These symptoms started " last night.  He read on a website that many of these symptoms are symptoms of COVID-19 so he was concerned.  He [place] that said they had recovered from COVID-[place].  He denies any known infectious COVID-19 exposures.  Emergency department evaluation showed heart rate 91, oxygen saturations 88 or 89% on room air, otherwise unremarkable vital signs, white blood cell 12.2, D-dimer 2.6, and otherwise unremarkable labs.  Of note, testing for COVID-19, influenza, and RSV was all negative.  EKG was obtained and showed no signs of coronary ischemia.  CT of chest/abdomen, pelvis with PE protocol showed extensive right lung airspace disease consistent with pneumonitis.  This was not thought to be particularly suggestive of COVID-19.  There was no PE or other worrisome finding.  Vandana was given Rocephin and Zithromax for community-acquired pneumonia and admitted to the hospital for further cares.  Vandana improved more quickly than expected. He weaned off of supplemental oxygen the morning after admission and was able to discharge home with 3 more days of Zithromax and 5 more days of Ceftin. I am requesting primary care follow up in 2-3 weeks with chest Xray to ensure resolutio of infiltrate.             Procedures / Imaging:   CT of chest           Consultations:   No consultations were requested during this admission             Pending Results:   None           Discharge Instructions and Follow-Up:   Discharge diet: Regular   Discharge activity: Activity as tolerated   Discharge follow-up: Follow up with primary care provider in 2-3 weeks with chest xray   Outpatient therapy: None    Other instructions: None             Discharge Medications:   Current Discharge Medication List      START taking these medications    Details   azithromycin (ZITHROMAX) 250 MG tablet Take 1 tablet (250 mg) by mouth daily for 3 days  Qty: 3 tablet, Refills: 0    Associated Diagnoses: Community acquired  pneumonia of right lower lobe of lung      cefuroxime (CEFTIN) 500 MG tablet Take 1 tablet (500 mg) by mouth every 12 hours for 5 days  Qty: 10 tablet, Refills: 0    Associated Diagnoses: Community acquired pneumonia of right lower lobe of lung         CONTINUE these medications which have NOT CHANGED    Details   Ascorbic Acid (VITAMIN C PO) Take 1,000 mg by mouth daily       aspirin 81 MG tablet Take 81 mg by mouth daily      calcium carbonate (OS-CHLOE) 500 MG tablet Take 1 tablet by mouth daily      Cholecalciferol (VITAMIN D3 PO) Take 2,000 Units by mouth daily       Multiple Vitamins-Minerals (CENTRUM SILVER) per tablet Take 1 tablet by mouth daily      Omega-3 Fatty Acids (FISH OIL OMEGA-3 PO) Take 1 g by mouth daily       simvastatin (ZOCOR) 20 MG tablet Take 20 mg by mouth daily      sildenafil (VIAGRA) 100 MG tablet Take 1 tablet (100 mg) by mouth daily as needed (erectile dysfunction) 30 min to 4 hrs before sex. Do not use with nitroglycerin, terazosin or doxazosin.  Qty: 50 tablet, Refills: 0    Associated Diagnoses: Erectile dysfunction, unspecified erectile dysfunction type                Total time spent in face to face contact with the patient and coordinating discharge was:  25 Minutes

## 2021-02-06 NOTE — PLAN OF CARE
End of Shift Summary  For vital signs and complete assessments, please see documentation flowsheets.     Pertinent assessments: Pt's A&O x 4. Denied pain, nausea, SOB. Pt is on 2L NC. Independent in the room.    Major Shift Events : uneventful  Treatment Plan: Lovenox, azithromycin, IV Rocephin.

## 2021-02-06 NOTE — PLAN OF CARE
AVS reviewed with patient. All questions answered. Filled prescriptions for Zithromax and Ceftin sent with patient at time of discharge. All belongings packed by patient. Pt feels much improved and agrees with plan to discharge home as he was able to wean off oxygen early this AM. Pt aware of when to follow up with primary MD and when/how to seek further treatment if symptoms worsen. No further needs identified. Pt given walking escort to personal vehicle as pt drove himself to the hospital.

## 2021-02-08 ENCOUNTER — PATIENT OUTREACH (OUTPATIENT)
Dept: CARE COORDINATION | Facility: CLINIC | Age: 71
End: 2021-02-08

## 2021-02-08 DIAGNOSIS — Z71.89 OTHER SPECIFIED COUNSELING: ICD-10-CM

## 2021-02-09 NOTE — PROGRESS NOTES
Clinic Care Coordination Contact  Community Health Worker Initial Outreach         Patient accepts CC:No, I'm doing good. I have no concerns or barriers.  I had excellent care at the hospital. Patient first thought I was calling for a [facility] survey. He is staying home due to the cold weather.

## 2021-03-04 ENCOUNTER — IMMUNIZATION (OUTPATIENT)
Dept: NURSING | Facility: CLINIC | Age: 71
End: 2021-03-04
Payer: COMMERCIAL

## 2021-03-04 PROCEDURE — 0011A PR COVID VAC MODERNA 100 MCG/0.5 ML IM: CPT

## 2021-03-04 PROCEDURE — 91301 PR COVID VAC MODERNA 100 MCG/0.5 ML IM: CPT

## 2021-04-01 ENCOUNTER — IMMUNIZATION (OUTPATIENT)
Dept: NURSING | Facility: CLINIC | Age: 71
End: 2021-04-01
Attending: INTERNAL MEDICINE
Payer: COMMERCIAL

## 2021-04-01 PROCEDURE — 0012A PR COVID VAC MODERNA 100 MCG/0.5 ML IM: CPT

## 2021-04-01 PROCEDURE — 91301 PR COVID VAC MODERNA 100 MCG/0.5 ML IM: CPT

## 2021-06-29 ENCOUNTER — OFFICE VISIT (OUTPATIENT)
Dept: CARDIOLOGY | Facility: CLINIC | Age: 71
End: 2021-06-29
Payer: COMMERCIAL

## 2021-06-29 VITALS
HEART RATE: 71 BPM | HEIGHT: 68 IN | WEIGHT: 185.7 LBS | DIASTOLIC BLOOD PRESSURE: 60 MMHG | BODY MASS INDEX: 28.14 KG/M2 | OXYGEN SATURATION: 96 % | SYSTOLIC BLOOD PRESSURE: 120 MMHG

## 2021-06-29 DIAGNOSIS — I25.10 CORONARY ARTERY DISEASE DUE TO LIPID RICH PLAQUE: Primary | ICD-10-CM

## 2021-06-29 DIAGNOSIS — I65.22 STENOSIS OF LEFT CAROTID ARTERY: ICD-10-CM

## 2021-06-29 DIAGNOSIS — I25.83 CORONARY ARTERY DISEASE DUE TO LIPID RICH PLAQUE: Primary | ICD-10-CM

## 2021-06-29 PROCEDURE — 99214 OFFICE O/P EST MOD 30 MIN: CPT | Performed by: INTERNAL MEDICINE

## 2021-06-29 RX ORDER — ROSUVASTATIN CALCIUM 20 MG/1
20 TABLET, COATED ORAL DAILY
Qty: 90 TABLET | Refills: 3 | Status: SHIPPED | OUTPATIENT
Start: 2021-06-29

## 2021-06-29 ASSESSMENT — MIFFLIN-ST. JEOR: SCORE: 1571.83

## 2021-06-29 NOTE — LETTER
6/29/2021    Reinier Santiago MD  [id number]  [facility] [address]    RE: Mj Meeks       Dear Colleague,    I had the pleasure of seeing Mj Meeks in the [facility].    Cardiology Progress Note          Assessment and Plan:       1. Moderate left internal carotid artery disease, asymptomatic    Last checked 2019, we reviewed the images of this calcified plaque.    Recheck carotid ultrasound.  Upgrade simvastatin to rosuvastatin 20 mg daily.  Patient will message us if he has any side effects on the rosuvastatin.      2. Coronary vascular calcification    Asymptomatic.    Consider stress testing prior to moving to [place] given borderline LV function and ectopy.        30 minutes was spent with the patient, precharting and reviewing tests as well as post visit charting all done today..    This note was transcribed using electronic voice recognition software and there may be typographical errors present.                    Interval History:     The patient is a very pleasant 71 year old whom I have seen for a number of years.  He is moving to the [facility] [place] to live in a log cabin adjacent to his son's property.  He is very excited about this and represents a very good opportunity for him to have excellent quality of life.  He has no cardiovascular complaints at this time.                     Review of Systems:     Review of Systems:  Skin:  Negative for bruising   Eyes:  Positive for glasses  ENT:  Negative for hearing loss  Respiratory:  Positive for dyspnea on exertion  Cardiovascular:  Negative for lightheadedness;Positive for  Gastroenterology: Negative constipation  Genitourinary:  not assessed decreased urinary stream  Musculoskeletal:  Negative for joint stiffness  Neurologic:  Negative    Psychiatric:  Negative excessive stress;anxiety;depression  Heme/Lymph/Imm:  Negative easy bruising  Endocrine:  Negative                Physical  "Exam:     Vitals: /60 (BP Location: Right arm, Patient Position: Sitting, Cuff Size: Adult Regular)   Pulse 71   Ht 1.727 m (5' 8\")   Wt 84.2 kg (185 lb 11.2 oz)   SpO2 96%   BMI 28.24 kg/m    Constitutional:  cooperative, alert and oriented, well developed, well nourished, in no acute distress        Skin:  warm and dry to the touch, no apparent skin lesions or masses noted        Head:  normocephalic, no masses or lesions        Eyes:  pupils equal and round, conjunctivae and lids unremarkable, sclera white, no xanthalasma, EOMS intact, no nystagmus        Chest:  normal symmetry        Cardiac: regular rhythm occasional premature beats     grade 1;systolic murmur          Extremities and Back:  no deformities, clubbing, cyanosis, erythema observed;no edema        Neurological:  no gross motor deficits;affect appropriate                 Medications:     Current Outpatient Medications   Medication Sig Dispense Refill     Ascorbic Acid (VITAMIN C PO) Take 1,000 mg by mouth daily        aspirin 81 MG tablet Take 81 mg by mouth daily       calcium carbonate (OS-CHLOE) 500 MG tablet Take 1 tablet by mouth daily       Cholecalciferol (VITAMIN D3 PO) Take 2,000 Units by mouth daily        Multiple Vitamins-Minerals (CENTRUM SILVER) per tablet Take 1 tablet by mouth daily       Omega-3 Fatty Acids (FISH OIL OMEGA-3 PO) Take 1 g by mouth daily        rosuvastatin (CRESTOR) 20 MG tablet Take 1 tablet (20 mg) by mouth daily 90 tablet 3     sildenafil (VIAGRA) 100 MG tablet Take 1 tablet (100 mg) by mouth daily as needed (erectile dysfunction) 30 min to 4 hrs before sex. Do not use with nitroglycerin, terazosin or doxazosin. 50 tablet 0                Data:   All laboratory data reviewed  No results found for this or any previous visit (from the past 24 hour(s)).    All laboratory data reviewed  Lab Results   Component Value Date    CHOL 139 12/17/2020     Lab Results   Component Value Date    HDL 81 12/17/2020 "     Lab Results   Component Value Date    LDL 48 12/17/2020     Lab Results   Component Value Date    TRIG 49 12/17/2020     Lab Results   Component Value Date    CHOLHDLRATIO 1.9 04/27/2015     No results found for: TSH  Last Basic Metabolic Panel:  Lab Results   Component Value Date     02/06/2021      Lab Results   Component Value Date    POTASSIUM 4.1 02/06/2021     Lab Results   Component Value Date    CHLORIDE 109 02/06/2021     Lab Results   Component Value Date    CHLOE 8.8 02/06/2021     Lab Results   Component Value Date    CO2 26 02/06/2021     Lab Results   Component Value Date    BUN 15 02/06/2021     Lab Results   Component Value Date    CR 0.79 02/06/2021     Lab Results   Component Value Date     02/06/2021     Lab Results   Component Value Date    WBC 12.3 02/06/2021     Lab Results   Component Value Date    RBC 4.34 02/06/2021     Lab Results   Component Value Date    HGB 13.7 02/06/2021     Lab Results   Component Value Date    HCT 41.2 02/06/2021     Lab Results   Component Value Date    MCV 95 02/06/2021     Lab Results   Component Value Date    MCH 31.6 02/06/2021     Lab Results   Component Value Date    MCHC 33.3 02/06/2021     Lab Results   Component Value Date    RDW 13.0 02/06/2021     Lab Results   Component Value Date     02/06/2021                   Thank you for allowing me to participate in the care of your patient.      Sincerely,     Ashok Little MD     [facility]  cc:   No referring provider defined for this encounter.

## 2021-06-29 NOTE — PROGRESS NOTES
"Cardiology Progress Note          Assessment and Plan:       1. Moderate left internal carotid artery disease, asymptomatic    Last checked 2019, we reviewed the images of this calcified plaque.    Recheck carotid ultrasound.  Upgrade simvastatin to rosuvastatin 20 mg daily.  Patient will message us if he has any side effects on the rosuvastatin.      2. Coronary vascular calcification    Asymptomatic.    Consider stress testing prior to moving to [place] given borderline LV function and ectopy.        30 minutes was spent with the patient, precharting and reviewing tests as well as post visit charting all done today..    This note was transcribed using electronic voice recognition software and there may be typographical errors present.                    Interval History:     The patient is a very pleasant 71 year old whom I have seen for a number of years.  He is moving to the [facility] to live in a log cabin adjacent to his son's property.  He is very excited about this and represents a very good opportunity for him to have excellent quality of life.  He has no cardiovascular complaints at this time.                     Review of Systems:     Review of Systems:  Skin:  Negative for bruising   Eyes:  Positive for glasses  ENT:  Negative for hearing loss  Respiratory:  Positive for dyspnea on exertion  Cardiovascular:  Negative for lightheadedness;Positive for  Gastroenterology: Negative constipation  Genitourinary:  not assessed decreased urinary stream  Musculoskeletal:  Negative for joint stiffness  Neurologic:  Negative    Psychiatric:  Negative excessive stress;anxiety;depression  Heme/Lymph/Imm:  Negative easy bruising  Endocrine:  Negative                Physical Exam:     Vitals: /60 (BP Location: Right arm, Patient Position: Sitting, Cuff Size: Adult Regular)   Pulse 71   Ht 1.727 m (5' 8\")   Wt 84.2 kg (185 lb 11.2 oz)   SpO2 96%   BMI 28.24 kg/m    Constitutional:  cooperative, alert and " oriented, well developed, well nourished, in no acute distress        Skin:  warm and dry to the touch, no apparent skin lesions or masses noted        Head:  normocephalic, no masses or lesions        Eyes:  pupils equal and round, conjunctivae and lids unremarkable, sclera white, no xanthalasma, EOMS intact, no nystagmus        Chest:  normal symmetry        Cardiac: regular rhythm occasional premature beats     grade 1;systolic murmur          Extremities and Back:  no deformities, clubbing, cyanosis, erythema observed;no edema        Neurological:  no gross motor deficits;affect appropriate                 Medications:     Current Outpatient Medications   Medication Sig Dispense Refill     Ascorbic Acid (VITAMIN C PO) Take 1,000 mg by mouth daily        aspirin 81 MG tablet Take 81 mg by mouth daily       calcium carbonate (OS-CHLOE) 500 MG tablet Take 1 tablet by mouth daily       Cholecalciferol (VITAMIN D3 PO) Take 2,000 Units by mouth daily        Multiple Vitamins-Minerals (CENTRUM SILVER) per tablet Take 1 tablet by mouth daily       Omega-3 Fatty Acids (FISH OIL OMEGA-3 PO) Take 1 g by mouth daily        rosuvastatin (CRESTOR) 20 MG tablet Take 1 tablet (20 mg) by mouth daily 90 tablet 3     sildenafil (VIAGRA) 100 MG tablet Take 1 tablet (100 mg) by mouth daily as needed (erectile dysfunction) 30 min to 4 hrs before sex. Do not use with nitroglycerin, terazosin or doxazosin. 50 tablet 0                Data:   All laboratory data reviewed  No results found for this or any previous visit (from the past 24 hour(s)).    All laboratory data reviewed  Lab Results   Component Value Date    CHOL 139 12/17/2020     Lab Results   Component Value Date    HDL 81 12/17/2020     Lab Results   Component Value Date    LDL 48 12/17/2020     Lab Results   Component Value Date    TRIG 49 12/17/2020     Lab Results   Component Value Date    CHOLHDLRATIO 1.9 04/27/2015     No results found for: TSH  Last Basic Metabolic  Panel:  Lab Results   Component Value Date     02/06/2021      Lab Results   Component Value Date    POTASSIUM 4.1 02/06/2021     Lab Results   Component Value Date    CHLORIDE 109 02/06/2021     Lab Results   Component Value Date    CHLOE 8.8 02/06/2021     Lab Results   Component Value Date    CO2 26 02/06/2021     Lab Results   Component Value Date    BUN 15 02/06/2021     Lab Results   Component Value Date    CR 0.79 02/06/2021     Lab Results   Component Value Date     02/06/2021     Lab Results   Component Value Date    WBC 12.3 02/06/2021     Lab Results   Component Value Date    RBC 4.34 02/06/2021     Lab Results   Component Value Date    HGB 13.7 02/06/2021     Lab Results   Component Value Date    HCT 41.2 02/06/2021     Lab Results   Component Value Date    MCV 95 02/06/2021     Lab Results   Component Value Date    MCH 31.6 02/06/2021     Lab Results   Component Value Date    MCHC 33.3 02/06/2021     Lab Results   Component Value Date    RDW 13.0 02/06/2021     Lab Results   Component Value Date     02/06/2021

## 2021-07-02 ENCOUNTER — HOSPITAL ENCOUNTER (OUTPATIENT)
Dept: ULTRASOUND IMAGING | Facility: CLINIC | Age: 71
Discharge: HOME | End: 2021-07-02
Attending: INTERNAL MEDICINE | Admitting: INTERNAL MEDICINE
Payer: COMMERCIAL

## 2021-07-02 ENCOUNTER — HOSPITAL ENCOUNTER (OUTPATIENT)
Dept: CARDIOLOGY | Facility: CLINIC | Age: 71
Discharge: HOME | End: 2021-07-02
Attending: INTERNAL MEDICINE | Admitting: INTERNAL MEDICINE
Payer: COMMERCIAL

## 2021-07-02 DIAGNOSIS — I25.10 CORONARY ARTERY DISEASE DUE TO LIPID RICH PLAQUE: ICD-10-CM

## 2021-07-02 DIAGNOSIS — I25.83 CORONARY ARTERY DISEASE DUE TO LIPID RICH PLAQUE: ICD-10-CM

## 2021-07-02 DIAGNOSIS — I65.22 STENOSIS OF LEFT CAROTID ARTERY: ICD-10-CM

## 2021-07-02 PROCEDURE — 93880 EXTRACRANIAL BILAT STUDY: CPT

## 2021-07-02 PROCEDURE — 93880 EXTRACRANIAL BILAT STUDY: CPT | Mod: 26 | Performed by: INTERNAL MEDICINE

## 2021-07-02 PROCEDURE — 93325 DOPPLER ECHO COLOR FLOW MAPG: CPT | Mod: 26 | Performed by: INTERNAL MEDICINE

## 2021-07-02 PROCEDURE — 93325 DOPPLER ECHO COLOR FLOW MAPG: CPT | Mod: TC

## 2021-07-02 PROCEDURE — 93350 STRESS TTE ONLY: CPT | Mod: 26 | Performed by: INTERNAL MEDICINE

## 2021-07-02 PROCEDURE — 93321 DOPPLER ECHO F-UP/LMTD STD: CPT | Mod: 26 | Performed by: INTERNAL MEDICINE

## 2021-07-02 PROCEDURE — 93018 CV STRESS TEST I&R ONLY: CPT | Performed by: INTERNAL MEDICINE

## 2021-07-02 PROCEDURE — 93016 CV STRESS TEST SUPVJ ONLY: CPT | Performed by: INTERNAL MEDICINE

## 2021-07-07 ENCOUNTER — TELEPHONE (OUTPATIENT)
Dept: CARDIOLOGY | Facility: CLINIC | Age: 71
End: 2021-07-07

## 2021-07-07 NOTE — TELEPHONE ENCOUNTER
Carotid ultrasound results and Dr. Little's recommendations below reviewed with patient. Patient verbalized understanding and has no further questions at this time.       The left neck artery has narrowed a little more, but still not generally to the point of needing anything done like surgery. Can follow up with new cardiologist in [place] when you move. (Dr. Little)

## 2021-07-07 NOTE — TELEPHONE ENCOUNTER
RN called patient and left detailed VM with his stress test results below.       ----- Message from Ashok Little MD sent at 7/7/2021 12:06 PM CDT -----  Overall stress test looked good without evidence of severe blockages at the heart rates achieved. The heart rate was a little slow which makes it not as effective of a test, but reassuring.

## 2021-09-30 DIAGNOSIS — N52.9 ERECTILE DYSFUNCTION, UNSPECIFIED ERECTILE DYSFUNCTION TYPE: ICD-10-CM

## 2021-10-01 RX ORDER — SILDENAFIL 100 MG/1
100 TABLET, FILM COATED ORAL DAILY PRN
Qty: 50 TABLET | Refills: 1 | Status: SHIPPED | OUTPATIENT
Start: 2021-10-01

## 2021-10-18 ENCOUNTER — TELEPHONE (OUTPATIENT)
Dept: FAMILY MEDICINE | Facility: CLINIC | Age: 71
End: 2021-10-18

## 2021-10-18 NOTE — TELEPHONE ENCOUNTER
[place] has [place] for him to get the moderna booster he needs a letter from him primary provider state he meets the requirement to get  the booster vaccine. Patient is requesting for the letter to be faxed to [phone] Attn Luis. Any questions for Vadnana he can be reached at [phone].  Barbara Ga   [facility]  Central Scheduler

## 2021-10-20 NOTE — TELEPHONE ENCOUNTER
Moderna has not quite yet been given a EUA for a booster.  The additional 3rd shot is just for people with immunocompromise from conditions such as cancer or on immunosuppressive medication.

## 2022-05-10 ENCOUNTER — TELEPHONE (OUTPATIENT)
Dept: FAMILY MEDICINE | Facility: CLINIC | Age: 72
End: 2022-05-10
Payer: COMMERCIAL

## 2022-05-10 NOTE — TELEPHONE ENCOUNTER
Patient Quality Outreach    Patient is due for the following:   Physical     NEXT STEPS:   pt moved to [place] and had AWV done there    Type of outreach:    Phone, spoke to patient/parent.        Questions for provider review:    None     María Elena Camara

## 2025-03-13 NOTE — PROGRESS NOTES
Medication: Omega-3 acid ethyl esters passed protocol.   Last office visit date: 11/15/2024  Next appointment scheduled?: Yes   Number of refills given: 2    Upcoming appointment scheduled on: 5/16/2025   Per last office visit, patient is to follow up in 6 months.   Last refill on: 6/24/2024       Pt was in the [facility] clinic for his flu shot and requested the MA staff member to check his BP.  BP was checked and recorded as below.    BP Readings from Last 3 Encounters:   09/24/20 130/70   03/23/20 120/72   03/23/20 120/72     MA came and got writer stating that the heart rate he got was in the 40s.  RN rechecked and got 54 and slightly irregular. Pt does not have any symptoms at all and has been more physically active and counting calories so he has lost over 30 lbs since he was last weighed.  Per cardio note pt was to be seen in 6-12 months.    Please advise if you would like to see patient now due to bradycardia.    Riki Fleming RN, BSN

## (undated) DEVICE — KIT ENDO TURNOVER/PROCEDURE W/CLEAN A SCOPE LINERS 103888

## (undated) RX ORDER — SIMETHICONE 40MG/0.6ML
SUSPENSION, DROPS(FINAL DOSAGE FORM)(ML) ORAL
Status: DISPENSED
Start: 2021-01-18

## (undated) RX ORDER — FENTANYL CITRATE 50 UG/ML
INJECTION, SOLUTION INTRAMUSCULAR; INTRAVENOUS
Status: DISPENSED
Start: 2021-01-18